# Patient Record
Sex: FEMALE | Race: ASIAN | NOT HISPANIC OR LATINO | Employment: FULL TIME | URBAN - METROPOLITAN AREA
[De-identification: names, ages, dates, MRNs, and addresses within clinical notes are randomized per-mention and may not be internally consistent; named-entity substitution may affect disease eponyms.]

---

## 2023-02-10 ENCOUNTER — HOSPITAL ENCOUNTER (EMERGENCY)
Facility: CLINIC | Age: 33
Discharge: HOME | End: 2023-02-10
Attending: EMERGENCY MEDICINE | Admitting: EMERGENCY MEDICINE
Payer: COMMERCIAL

## 2023-02-10 ENCOUNTER — APPOINTMENT (OUTPATIENT)
Dept: CT IMAGING | Facility: CLINIC | Age: 33
End: 2023-02-10
Attending: EMERGENCY MEDICINE
Payer: COMMERCIAL

## 2023-02-10 VITALS
BODY MASS INDEX: 23.56 KG/M2 | OXYGEN SATURATION: 98 % | TEMPERATURE: 97.9 F | SYSTOLIC BLOOD PRESSURE: 98 MMHG | HEIGHT: 60 IN | DIASTOLIC BLOOD PRESSURE: 65 MMHG | WEIGHT: 120 LBS | HEART RATE: 51 BPM | RESPIRATION RATE: 22 BRPM

## 2023-02-10 DIAGNOSIS — N20.0 KIDNEY STONE: ICD-10-CM

## 2023-02-10 LAB
ALBUMIN SERPL BCG-MCNC: 4.1 G/DL (ref 3.5–5.2)
ALBUMIN UR-MCNC: 10 MG/DL
ALP SERPL-CCNC: 49 U/L (ref 35–104)
ALT SERPL W P-5'-P-CCNC: 11 U/L (ref 10–35)
ANION GAP SERPL CALCULATED.3IONS-SCNC: 11 MMOL/L (ref 7–15)
APPEARANCE UR: ABNORMAL
AST SERPL W P-5'-P-CCNC: 27 U/L (ref 10–35)
BASOPHILS # BLD AUTO: 0 10E3/UL (ref 0–0.2)
BASOPHILS NFR BLD AUTO: 0 %
BILIRUB SERPL-MCNC: 0.2 MG/DL
BILIRUB UR QL STRIP: NEGATIVE
BUN SERPL-MCNC: 14 MG/DL (ref 6–20)
CALCIUM SERPL-MCNC: 9.2 MG/DL (ref 8.6–10)
CHLORIDE SERPL-SCNC: 99 MMOL/L (ref 98–107)
COLOR UR AUTO: ABNORMAL
CREAT SERPL-MCNC: 0.77 MG/DL (ref 0.51–0.95)
DEPRECATED HCO3 PLAS-SCNC: 26 MMOL/L (ref 22–29)
EOSINOPHIL # BLD AUTO: 0.1 10E3/UL (ref 0–0.7)
EOSINOPHIL NFR BLD AUTO: 1 %
ERYTHROCYTE [DISTWIDTH] IN BLOOD BY AUTOMATED COUNT: 12.7 % (ref 10–15)
GFR SERPL CREATININE-BSD FRML MDRD: >90 ML/MIN/1.73M2
GLUCOSE SERPL-MCNC: 116 MG/DL (ref 70–99)
GLUCOSE UR STRIP-MCNC: NEGATIVE MG/DL
HCG SERPL QL: NEGATIVE
HCT VFR BLD AUTO: 38.9 % (ref 35–47)
HGB BLD-MCNC: 12.7 G/DL (ref 11.7–15.7)
HGB UR QL STRIP: ABNORMAL
IMM GRANULOCYTES # BLD: 0 10E3/UL
IMM GRANULOCYTES NFR BLD: 0 %
KETONES UR STRIP-MCNC: NEGATIVE MG/DL
LEUKOCYTE ESTERASE UR QL STRIP: ABNORMAL
LYMPHOCYTES # BLD AUTO: 4.1 10E3/UL (ref 0.8–5.3)
LYMPHOCYTES NFR BLD AUTO: 41 %
MCH RBC QN AUTO: 28.3 PG (ref 26.5–33)
MCHC RBC AUTO-ENTMCNC: 32.6 G/DL (ref 31.5–36.5)
MCV RBC AUTO: 87 FL (ref 78–100)
MONOCYTES # BLD AUTO: 0.5 10E3/UL (ref 0–1.3)
MONOCYTES NFR BLD AUTO: 5 %
MUCOUS THREADS #/AREA URNS LPF: PRESENT /LPF
NEUTROPHILS # BLD AUTO: 5.4 10E3/UL (ref 1.6–8.3)
NEUTROPHILS NFR BLD AUTO: 53 %
NITRATE UR QL: NEGATIVE
NRBC # BLD AUTO: 0 10E3/UL
NRBC BLD AUTO-RTO: 0 /100
PH UR STRIP: 6 [PH] (ref 5–7)
PLATELET # BLD AUTO: 235 10E3/UL (ref 150–450)
POTASSIUM SERPL-SCNC: 3.8 MMOL/L (ref 3.4–5.3)
PROT SERPL-MCNC: 7.1 G/DL (ref 6.4–8.3)
RBC # BLD AUTO: 4.49 10E6/UL (ref 3.8–5.2)
RBC URINE: 125 /HPF
SODIUM SERPL-SCNC: 136 MMOL/L (ref 136–145)
SP GR UR STRIP: 1.02 (ref 1–1.03)
SQUAMOUS EPITHELIAL: 17 /HPF
UROBILINOGEN UR STRIP-MCNC: NORMAL MG/DL
WBC # BLD AUTO: 10.2 10E3/UL (ref 4–11)
WBC URINE: 7 /HPF

## 2023-02-10 PROCEDURE — 84703 CHORIONIC GONADOTROPIN ASSAY: CPT | Performed by: EMERGENCY MEDICINE

## 2023-02-10 PROCEDURE — 99285 EMERGENCY DEPT VISIT HI MDM: CPT | Mod: 25

## 2023-02-10 PROCEDURE — 96374 THER/PROPH/DIAG INJ IV PUSH: CPT | Mod: 59

## 2023-02-10 PROCEDURE — 258N000003 HC RX IP 258 OP 636: Performed by: EMERGENCY MEDICINE

## 2023-02-10 PROCEDURE — 80053 COMPREHEN METABOLIC PANEL: CPT | Performed by: EMERGENCY MEDICINE

## 2023-02-10 PROCEDURE — 81001 URINALYSIS AUTO W/SCOPE: CPT | Performed by: EMERGENCY MEDICINE

## 2023-02-10 PROCEDURE — 250N000013 HC RX MED GY IP 250 OP 250 PS 637: Performed by: EMERGENCY MEDICINE

## 2023-02-10 PROCEDURE — 96375 TX/PRO/DX INJ NEW DRUG ADDON: CPT

## 2023-02-10 PROCEDURE — 36415 COLL VENOUS BLD VENIPUNCTURE: CPT | Performed by: EMERGENCY MEDICINE

## 2023-02-10 PROCEDURE — 250N000009 HC RX 250: Performed by: EMERGENCY MEDICINE

## 2023-02-10 PROCEDURE — 250N000011 HC RX IP 250 OP 636: Performed by: EMERGENCY MEDICINE

## 2023-02-10 PROCEDURE — 74177 CT ABD & PELVIS W/CONTRAST: CPT

## 2023-02-10 PROCEDURE — 85018 HEMOGLOBIN: CPT | Performed by: EMERGENCY MEDICINE

## 2023-02-10 PROCEDURE — 96376 TX/PRO/DX INJ SAME DRUG ADON: CPT

## 2023-02-10 PROCEDURE — 96361 HYDRATE IV INFUSION ADD-ON: CPT

## 2023-02-10 RX ORDER — IOPAMIDOL 755 MG/ML
60 INJECTION, SOLUTION INTRAVASCULAR ONCE
Status: COMPLETED | OUTPATIENT
Start: 2023-02-10 | End: 2023-02-10

## 2023-02-10 RX ORDER — OXYCODONE HYDROCHLORIDE 5 MG/1
5 TABLET ORAL EVERY 6 HOURS PRN
Qty: 6 TABLET | Refills: 0 | Status: SHIPPED | OUTPATIENT
Start: 2023-02-10 | End: 2023-02-13

## 2023-02-10 RX ORDER — HYDROMORPHONE HYDROCHLORIDE 1 MG/ML
0.5 INJECTION, SOLUTION INTRAMUSCULAR; INTRAVENOUS; SUBCUTANEOUS
Status: DISCONTINUED | OUTPATIENT
Start: 2023-02-10 | End: 2023-02-10 | Stop reason: HOSPADM

## 2023-02-10 RX ORDER — ONDANSETRON 4 MG/1
4 TABLET, ORALLY DISINTEGRATING ORAL EVERY 8 HOURS PRN
Qty: 10 TABLET | Refills: 0 | Status: SHIPPED | OUTPATIENT
Start: 2023-02-10 | End: 2023-02-28

## 2023-02-10 RX ORDER — ONDANSETRON 2 MG/ML
4 INJECTION INTRAMUSCULAR; INTRAVENOUS EVERY 30 MIN PRN
Status: DISCONTINUED | OUTPATIENT
Start: 2023-02-10 | End: 2023-02-10 | Stop reason: HOSPADM

## 2023-02-10 RX ORDER — SODIUM CHLORIDE, SODIUM LACTATE, POTASSIUM CHLORIDE, CALCIUM CHLORIDE 600; 310; 30; 20 MG/100ML; MG/100ML; MG/100ML; MG/100ML
125 INJECTION, SOLUTION INTRAVENOUS CONTINUOUS
Status: DISCONTINUED | OUTPATIENT
Start: 2023-02-10 | End: 2023-02-10 | Stop reason: HOSPADM

## 2023-02-10 RX ORDER — OXYCODONE HYDROCHLORIDE 5 MG/1
5 TABLET ORAL ONCE
Status: COMPLETED | OUTPATIENT
Start: 2023-02-10 | End: 2023-02-10

## 2023-02-10 RX ADMIN — ONDANSETRON 4 MG: 2 INJECTION INTRAMUSCULAR; INTRAVENOUS at 03:54

## 2023-02-10 RX ADMIN — HYDROMORPHONE HYDROCHLORIDE 0.5 MG: 1 INJECTION, SOLUTION INTRAMUSCULAR; INTRAVENOUS; SUBCUTANEOUS at 03:01

## 2023-02-10 RX ADMIN — SODIUM CHLORIDE 60 ML: 900 INJECTION INTRAVENOUS at 02:37

## 2023-02-10 RX ADMIN — IOPAMIDOL 60 ML: 755 INJECTION, SOLUTION INTRAVENOUS at 02:37

## 2023-02-10 RX ADMIN — OXYCODONE HYDROCHLORIDE 5 MG: 5 TABLET ORAL at 03:54

## 2023-02-10 RX ADMIN — SODIUM CHLORIDE, POTASSIUM CHLORIDE, SODIUM LACTATE AND CALCIUM CHLORIDE 1000 ML: 600; 310; 30; 20 INJECTION, SOLUTION INTRAVENOUS at 02:20

## 2023-02-10 RX ADMIN — ONDANSETRON 4 MG: 2 INJECTION INTRAMUSCULAR; INTRAVENOUS at 02:20

## 2023-02-10 ASSESSMENT — ENCOUNTER SYMPTOMS
DIFFICULTY URINATING: 0
HEMATURIA: 0
DYSURIA: 0
BACK PAIN: 1
ABDOMINAL PAIN: 1
VOMITING: 1
NAUSEA: 1
DIARRHEA: 0

## 2023-02-10 ASSESSMENT — ACTIVITIES OF DAILY LIVING (ADL): ADLS_ACUITY_SCORE: 35

## 2023-02-10 NOTE — ED PROVIDER NOTES
History     Chief Complaint:  Abdominal Pain       The history is provided by the patient.      Lidia García is a 32 year old female who presents with abdominal pain. Patient reports that she was woken up out of sleep around midnight with severe right sided abdominal and back pain. She states that she is experiencing associated nausea and an episode of vomiting with no relief. She states that the pain is worst when she is laying down and rates it 7/10. She notes that she took 3 Ibuprofen 1.5 hours ago with no resolve. She adds that she was feeling fine during the day yesterday. She denies medical problems, or recent surgeries.     Independent Historian:   None - Patient Only    Review of External Notes: none     ROS:  Review of Systems   Gastrointestinal: Positive for abdominal pain, nausea and vomiting. Negative for diarrhea.   Genitourinary: Negative for difficulty urinating, dysuria and hematuria.   Musculoskeletal: Positive for back pain.   All other systems reviewed and are negative.    Allergies:  No Known Allergies     Medications:    Zoloft   Spironolactone     Past Medical History:    No past medical history on file.    Social History:  Presents with    Presents via private vehicle   PCP: No primary care provider on file.     Physical Exam     Patient Vitals for the past 24 hrs:   BP Temp Temp src Pulse Resp SpO2 Height Weight   02/10/23 0428 -- -- -- -- -- 98 % -- --   02/10/23 0427 98/65 -- -- 51 -- -- -- --   02/10/23 0132 116/69 97.9  F (36.6  C) Oral 65 22 100 % 1.524 m (5') 54.4 kg (120 lb)        Physical Exam  General: Appears well-developed and well-nourished.   Head: No signs of trauma.   CV: Normal rate and regular rhythm.    Resp: Effort normal and breath sounds normal. No respiratory distress.   GI: Soft. There is RLQ tenderness.  No rebound or guarding.  Normal bowel sounds.  No CVA tenderness.  MSK: Normal range of motion.   Neuro: The patient is alert and oriented. Speech  normal.  Skin: Skin is warm and dry. No rash noted.   Psych: normal mood and affect. behavior is normal.       Emergency Department Course   Imaging:  CT Abdomen Pelvis w Contrast   Final Result   IMPRESSION:    1.  3 mm obstructing stone distal right ureter at the UVJ resulting in minimal right hydronephrosis.      2.  Normal appendix. No acute bowel findings.      3.  Large amount of stool in the colon.      4.  2.8 cm left ovarian cyst is nonspecific on CT and most likely physiologic in nature. No specific follow-up needed unless clinically warranted.         Report per radiology    Laboratory:  Labs Ordered and Resulted from Time of ED Arrival to Time of ED Departure   COMPREHENSIVE METABOLIC PANEL - Abnormal       Result Value    Sodium 136      Potassium 3.8      Chloride 99      Carbon Dioxide (CO2) 26      Anion Gap 11      Urea Nitrogen 14.0      Creatinine 0.77      Calcium 9.2      Glucose 116 (*)     Alkaline Phosphatase 49      AST 27      ALT 11      Protein Total 7.1      Albumin 4.1      Bilirubin Total 0.2      GFR Estimate >90     URINE MACROSCOPIC WITH REFLEX TO MICRO - Abnormal    Color Urine Light Yellow      Appearance Urine Slightly Cloudy (*)     Glucose Urine Negative      Bilirubin Urine Negative      Ketones Urine Negative      Specific Gravity Urine 1.022      Blood Urine Large (*)     pH Urine 6.0      Protein Albumin Urine 10 (*)     Urobilinogen Urine Normal      Nitrite Urine Negative      Leukocyte Esterase Urine Moderate (*)     RBC Urine 125 (*)     WBC Urine 7 (*)     Squamous Epithelials Urine 17 (*)     Mucus Urine Present (*)    HCG QUALITATIVE PREGNANCY - Normal    hCG Serum Qualitative Negative     CBC WITH PLATELETS AND DIFFERENTIAL    WBC Count 10.2      RBC Count 4.49      Hemoglobin 12.7      Hematocrit 38.9      MCV 87      MCH 28.3      MCHC 32.6      RDW 12.7      Platelet Count 235      % Neutrophils 53      % Lymphocytes 41      % Monocytes 5      % Eosinophils 1       % Basophils 0      % Immature Granulocytes 0      NRBCs per 100 WBC 0      Absolute Neutrophils 5.4      Absolute Lymphocytes 4.1      Absolute Monocytes 0.5      Absolute Eosinophils 0.1      Absolute Basophils 0.0      Absolute Immature Granulocytes 0.0      Absolute NRBCs 0.0        Emergency Department Course & Assessments:    Interventions:  Medications   lactated ringers BOLUS 1,000 mL (0 mLs Intravenous Stopped 2/10/23 0428)     Followed by   lactated ringers infusion (125 mL/hr Intravenous Not Given 2/10/23 0353)   ondansetron (ZOFRAN) injection 4 mg (4 mg Intravenous Given 2/10/23 0354)   HYDROmorphone (PF) (DILAUDID) injection 0.5 mg (0.5 mg Intravenous Given 2/10/23 0301)   iopamidol (ISOVUE-370) solution 60 mL (60 mLs Intravenous Given 2/10/23 0237)   Saline (60 mLs Intravenous Given 2/10/23 0237)   oxyCODONE (ROXICODONE) tablet 5 mg (5 mg Oral Given 2/10/23 0354)      Independent Interpretation (X-rays, CTs, rhythm strip):  I reviewed CT     Social Determinants of Health affecting care:   None    Assessments:  ED Course as of 02/10/23 0439   Fri Feb 10, 2023   0152 I obtained history and examined the patient as noted above    0335 I rechecked the patient      Disposition:  The patient was discharged to home.     Impression & Plan    Medical Decision Making:  Lidia García is a 32-year-old woman presents due to right-sided abdominal pain.  She awoke with the pain, had some nausea.  She did take ibuprofen at home.  On evaluation she did have tenderness to the right lower quadrant of the abdomen.  Blood was obtained and was reassuring.  UA did show red blood cells.  CT scan showed findings consistent with a ureteral kidney stone, which would be consistent with her presentation.  I discussed supportive care instructions and she is instructed to return for any signs of infection.  Patient was discharged home    Diagnosis:    ICD-10-CM    1. Kidney stone  N20.0            Discharge Medications:  Discharge  Medication List as of 2/10/2023  4:28 AM      START taking these medications    Details   ondansetron (ZOFRAN ODT) 4 MG ODT tab Take 1 tablet (4 mg) by mouth every 8 hours as needed for nausea, Disp-10 tablet, R-0, E-Prescribe      oxyCODONE (ROXICODONE) 5 MG tablet Take 1 tablet (5 mg) by mouth every 6 hours as needed for severe pain (7-10), Disp-6 tablet, R-0, E-Prescribe              Scribe Disclosure:  I, Kathy Richardson, am serving as a scribe at 2:21 AM on 2/10/2023 to document services personally performed by Dave Estrella MD based on my observations and the provider's statements to me.     2/10/2023   Dave Estrella MD Bergenstal, John A, MD  02/11/23 0745

## 2023-02-10 NOTE — ED TRIAGE NOTES
RLQ radiates to lower back pain that woke her up from sleep 2 hrs ago. Took 600 mg of ibuprofen PTA without relief.     Triage Assessment     Row Name 02/10/23 0133       Triage Assessment (Adult)    Airway WDL WDL       Respiratory WDL    Respiratory WDL WDL       Skin Circulation/Temperature WDL    Skin Circulation/Temperature WDL WDL       Cardiac WDL    Cardiac WDL WDL       Peripheral/Neurovascular WDL    Peripheral Neurovascular WDL WDL       Cognitive/Neuro/Behavioral WDL    Cognitive/Neuro/Behavioral WDL WDL

## 2023-02-28 ENCOUNTER — OFFICE VISIT (OUTPATIENT)
Dept: INTERNAL MEDICINE | Facility: CLINIC | Age: 33
End: 2023-02-28
Payer: COMMERCIAL

## 2023-02-28 VITALS
BODY MASS INDEX: 25.27 KG/M2 | OXYGEN SATURATION: 99 % | DIASTOLIC BLOOD PRESSURE: 68 MMHG | HEIGHT: 60 IN | WEIGHT: 128.7 LBS | SYSTOLIC BLOOD PRESSURE: 103 MMHG | HEART RATE: 62 BPM

## 2023-02-28 DIAGNOSIS — F32.89 OTHER DEPRESSION: Primary | ICD-10-CM

## 2023-02-28 DIAGNOSIS — L70.0 ACNE VULGARIS: ICD-10-CM

## 2023-02-28 PROCEDURE — 99204 OFFICE O/P NEW MOD 45 MIN: CPT | Mod: GE

## 2023-02-28 RX ORDER — SPIRONOLACTONE 25 MG/1
75 TABLET ORAL DAILY
COMMUNITY
End: 2023-02-28

## 2023-02-28 RX ORDER — SPIRONOLACTONE 25 MG/1
75 TABLET ORAL DAILY
Qty: 90 TABLET | Refills: 3 | Status: SHIPPED | OUTPATIENT
Start: 2023-02-28 | End: 2023-08-21

## 2023-02-28 ASSESSMENT — ENCOUNTER SYMPTOMS
NAUSEA: 0
DYSURIA: 0
CONSTIPATION: 1
JAUNDICE: 0
BLOOD IN STOOL: 0
FLANK PAIN: 1
RECTAL PAIN: 0
ABDOMINAL PAIN: 0
DIARRHEA: 0
BOWEL INCONTINENCE: 0
DIFFICULTY URINATING: 0
HEMATURIA: 0
VOMITING: 0
HEARTBURN: 0
BLOATING: 1

## 2023-02-28 NOTE — NURSING NOTE
Lidia García is a 32 year old female patient that presents today in clinic for the following:    Chief Complaint   Patient presents with     Establish Care     Physical, history of abnormal paps     The patient's allergies and medications were reviewed as noted. A set of vitals were recorded as noted without incident. The patient does not have any other questions for the provider.    Gal Cain, EMT at 1:08 PM on 2/28/2023

## 2023-02-28 NOTE — PROGRESS NOTES
PRIMARY [facility]ARE CENTER         HPI:      HPI:  Lidia García is a 32 year old female with PMH of Depression and Acne who presents to clinic to establish care.  She recently moved from [place] to [place] in 2023. Diagnosed with depression in college. Started taking Sertraline about 1.5 yrs ago around 2021. Has not had a depressive episode since college. Her last annual wellness exam was in April 2022.     Takes spironolactone for acne which helps her. She has been taking that for about 5 yrs. She has tried topicals in the past, but they have not been covered by her insurance.  She has never had issues with electrolyte abnormalities    She was in the ED on 2/10/23 due to RLQ abd pain which radiated to her flank. Was found to have a  3 mm obstructing stone distal right ureter at the UVJ resulting in minimal right hydronephrosis. She was given pain medication and a mesh to urinate into. She did not feel it pass and never caught a stone.  No issues since.     She used to have heavy periods. She got Mirena in 2019. Her periods are now very irregular. She can feel that the strings are still in place. She has had a lot of bloating and constipation in the past. She has had irregular pap smears as well.  Her last 2 Pap smears have been irregular.  She was followed by OB/GYN at [facility] and was told she needed to have a repeat Pap in 1 year.  Denies fatigue,  fevers, chills, CP, SOB, abd pain, N/V/D and BLE edema      Family History:  I have reviewed this patient's family history and updated it with pertinent information if needed.  Family History   Problem Relation Age of Onset     Asthma Mother      Eczema Mother      Diabetes Father      Hypertension Father      Arrhythmia Brother             Social History:  Occupation: Therapist at [employer]  Lives: [place], closing on a house soon  Smoking: never cigarettes  Other rec/illicit drug use: Used to smoke marijuana (no substances in the past 10 yrs)  Sexual activity:  sexually active with her   Alcohol: 1 drink about 2-3 times per week  Has a younger brother and an older half brother    Medications:  Current Outpatient Medications   Medication     sertraline (ZOLOFT) 50 MG tablet     spironolactone (ALDACTONE) 25 MG tablet     No current facility-administered medications for this visit.        Allergies:   No Known Allergies    Medical History:  Past Medical History:   Diagnosis Date     Acne vulgaris      Depression        Surgical History:  Past Surgical History:   Procedure Laterality Date     wisdom teeth      2006 and 2019              Review of Systems:     See HPI for pertinent ROS         Physical Exam:   /68 (BP Location: Left arm, Patient Position: Sitting, Cuff Size: Adult Regular)   Pulse 62   Ht 1.524 m (5')   Wt 58.4 kg (128 lb 11.2 oz)   LMP 02/14/2023 (Within Days)   SpO2 99%   Breastfeeding No   BMI 25.13 kg/m    Body mass index is 25.13 kg/m .  Vitals were reviewed    Physical Exam:   General: Sitting upright, talking in complete sentences, non-distressed  HEENT: Normocephalic, atraumatic, PERRL, EOMI, no conjunctival pallor, anicteric, nares patent, oropharynx clear and moist  CVS: S1/S2 WNL, RRR, no murmur, no LE edema  Pulm: Non-labored breathing, clear to auscultation b/l, no crackles or wheeze  Abdo: Non-distended, non-tender to palpation, no rebound or guarding, BS present   MSK: No obvious bony deformities, no clubbing  Skin: Warm and dry, no obvious rashes  Neuro: Alert and oriented x3, non-focal, moves all extremities spontaneously  Psych: Normal mood and affect, very pleasant      Assessment and Plan     Lidia García is a 32 year old female with PMH of Depression and Acne who presents to clinic to establish care.    Health Maintenance  Irregular Periods  We we will do an annual wellness exam in May.  She was previously seen at [facility].  She was told that she needed a Pap smear yearly.  She also reports having  "irregular periods recently. Recent renal stone in 2/23, was seen at [facility] ED. Stone passed. Recent labs normal. CT A/P showed an ovarian cyst, likely benign per imaging report. We will try to get her records from [facility] (she completed RAY).  We will plan to repeat her Pap in May at her annual exam.    Per [facility] note:  \"- LSIL pap--initiallly HPV neg, 4/2022 HPV +  A/P Adequate Colposcopy, no cervical lesions seen grossly or on colpo  Iram'l hx of HPV discussed  - AUB--resolved since her past visit- cont to track menses with Mirena---slightly long spotting even with Tampons use  Normal u/s 1 yr ago- tiny fibroid\"      Other depression  -     sertraline (ZOLOFT) 50 MG tablet; Take 1 tablet (50 mg) by mouth daily    Acne vulgaris  -     spironolactone (ALDACTONE) 25 MG tablet; Take 3 tablets (75 mg) by mouth daily 3 TABLETS DAILY      Additional Issues:  - Wellness exam next visit      RTC in may 2023 for annual exam    Options for treatment and follow-up care were reviewed with the patient. Lidia García engaged in the decision making process and verbalized understanding of the options discussed and agreed with the final plan.        Pt was seen and plan of care discussed with AISHA DUDLEY MD  Internal Medicine-PGY2  [facility]  Pager: [phone]  Feb 28, 2023    "

## 2023-03-07 NOTE — PROGRESS NOTES
Lidia is a 32 year old who is being evaluated via a billable video visit.      How would you like to obtain your AVS? MyChart  If the video visit is dropped, the invitation should be resent by: Text to cell phone: [phone]  Will anyone else be joining your video visit? No        Assessment & Plan   Problem List Items Addressed This Visit    None  Visit Diagnoses     Temporomandibular joint disorder    -  Primary    Relevant Orders    Physical Therapy Referral    Slow transit constipation        Relevant Medications    polyethylene glycol (MIRALAX) 17 GM/Dose powder    External hemorrhoids        Relevant Medications    hydrocortisone (ANUSOL-HC) 25 MG suppository         Reduce starchy food in the diet  Increase water intake and fruit and veggies  Miralax 1 capful daily for 3-5 days as needed when constipated  Use HC suppository to help heal hemorrhoids     12 minutes spent on the date of the encounter doing chart review, history and exam, documentation and further activities per the note       BMI:   Estimated body mass index is 25.13 kg/m  as calculated from the following:    Height as of 2/28/23: 1.524 m (5').    Weight as of 2/28/23: 58.4 kg (128 lb 11.2 oz).   Weight management plan: Discussed healthy diet and exercise guidelines        Return in about 1 month (around 4/8/2023), or if symptoms worsen or fail to improve, for in person.    Analia Greene PA-C  [facility]    Annabella Murillo is a 32 year old, presenting for the following health issues:  Constipation, Hemorrhoids, and Referral (Physical therapy for TMJ )      History of Present Illness       Reason for visit:  Constipation and Rectal Bleeding  Symptom onset:  More than a month  Symptom intensity:  Moderate  Symptom progression:  Staying the same  Had these symptoms before:  No        Constipation  Onset/Duration: 6 months   Description:  Frequency of bowel movements: on an almost daily basis  Consistency  of stool: Normal recently  Progression of Symptoms: same and waxing and waning  Accompanying signs and symptoms:    Abdominal pain: No   Rectal pain: YES   Blood in stool: YES- blood after wiping    Nausea/Vomiting: No   Weight loss or gain: YES- weight gain   History:   Similar problems in past: YES, on and off  History of abdominal surgery: No  Chronic laxative use: No  New medications: No  Precipitating or alleviating factors: None   Therapies tried and outcome: None    Hemorrhoids       Duration: intermittent 6 months     Description:   Pain: no   Itching: YES    Accompanying signs and symptoms:   Blood in stool: YES- on toilet paper  Changes in stool pattern: YES- constipated on and off     History (similar episodes/previous evaluation): eats high starch diet daily -rice, pasta, bread    Precipitating or alleviating factors: constipation    Therapies tried and outcome: increased fiber in diet      Concern:     Patient needs a referral to physical therapy for TMJ disorder. Patient has been doing therapy in [facility], and since she moved here, she schedule appointment with [facility] clinic, but needs a referral for insurance coverage.       Review of Systems   Constitutional, HEENT, cardiovascular, pulmonary, gi and gu systems are negative, except as otherwise noted.      Objective           Vitals:  No vitals were obtained today due to virtual visit.    Physical Exam   GENERAL: Healthy, alert and no distress  EYES: Eyes grossly normal to inspection.  No discharge or erythema, or obvious scleral/conjunctival abnormalities.  RESP: No audible wheeze, cough, or visible cyanosis.  No visible retractions or increased work of breathing.    SKIN: Visible skin clear. No significant rash, abnormal pigmentation or lesions.  NEURO: Cranial nerves grossly intact.  Mentation and speech appropriate for age.  PSYCH: Mentation appears normal, affect normal/bright, judgement and insight intact, normal speech and appearance  well-groomed.                Video-Visit Details    Type of service:  Video Visit     Originating Location (pt. Location): Home  Distant Location (provider location):  Off-site  Platform used for Video Visit: Tomasa

## 2023-03-07 NOTE — PATIENT INSTRUCTIONS
At [facility], we strive to deliver an exceptional experience to you, every time we see you. If you receive a survey, please complete it as we do value your feedback.  If you have MyChart, you can expect to receive results automatically within 24 hours of their completion.  Your provider will send a note interpreting your results as well.   If you do not have MyChart, you should receive your results in about a week by mail.    Your care team:                            Family Medicine Internal Medicine   MD Kofi Drew MD Shantel Branch-Fleming, MD Srinivasa Vaka, MD Katya Belousova, PAJUNIOR Haywood CNP, MD ([facility]   Jordy Reyes, MD Helga Mayer MD Amelia Massimini APRN DAIANA Patrick APRN MD Chris Ramos MD          Clinic hours: Monday - Thursday 7 am-6 pm; Fridays 7 am-5 pm.   Urgent care: Monday - Friday 10 am- 8 pm; Saturday and Sunday 9 am-5 pm.    Clinic: [phone]       [facility]: Monday - Thursday 8 am - 7 pm; Friday 8 am - 6 pm  [facility]: [phone]

## 2023-03-08 ENCOUNTER — VIRTUAL VISIT (OUTPATIENT)
Dept: FAMILY MEDICINE | Facility: CLINIC | Age: 33
End: 2023-03-08
Payer: COMMERCIAL

## 2023-03-08 DIAGNOSIS — M26.609 TEMPOROMANDIBULAR JOINT DISORDER: Primary | ICD-10-CM

## 2023-03-08 DIAGNOSIS — K59.01 SLOW TRANSIT CONSTIPATION: ICD-10-CM

## 2023-03-08 DIAGNOSIS — K64.4 EXTERNAL HEMORRHOIDS: ICD-10-CM

## 2023-03-08 PROCEDURE — 99213 OFFICE O/P EST LOW 20 MIN: CPT | Mod: VID | Performed by: PHYSICIAN ASSISTANT

## 2023-03-08 RX ORDER — POLYETHYLENE GLYCOL 3350 17 G/17G
1 POWDER, FOR SOLUTION ORAL DAILY
Qty: 527 G | Refills: 1 | Status: SHIPPED | OUTPATIENT
Start: 2023-03-08 | End: 2023-04-23

## 2023-03-08 RX ORDER — HYDROCORTISONE ACETATE 25 MG/1
25 SUPPOSITORY RECTAL 2 TIMES DAILY
Qty: 24 SUPPOSITORY | Refills: 3 | Status: SHIPPED | OUTPATIENT
Start: 2023-03-08 | End: 2023-05-09

## 2023-03-27 ENCOUNTER — THERAPY VISIT (OUTPATIENT)
Dept: PHYSICAL THERAPY | Facility: CLINIC | Age: 33
End: 2023-03-27
Payer: COMMERCIAL

## 2023-03-27 DIAGNOSIS — M26.602 LEFT TEMPOROMANDIBULAR JOINT DISORDER, UNSPECIFIED: ICD-10-CM

## 2023-03-27 DIAGNOSIS — M26.609 TEMPOROMANDIBULAR JOINT DISORDER: ICD-10-CM

## 2023-03-27 PROCEDURE — 97140 MANUAL THERAPY 1/> REGIONS: CPT | Mod: GP | Performed by: PHYSICAL THERAPIST

## 2023-03-27 PROCEDURE — 97161 PT EVAL LOW COMPLEX 20 MIN: CPT | Mod: GP | Performed by: PHYSICAL THERAPIST

## 2023-03-27 NOTE — PROGRESS NOTES
Physical Therapy Initial Evaluation  Subjective:  Lidia García presents to outpatient PT with chronic left sided TMJ pain.  H/o locking jaw when younger, but can't remember the last time this happened.  Symptoms described as muscle tightness.  H/o migraines (2-3x/week).  Has a bottom retainer (braces as teenager) and a nightguard.  Thinks she clenches/grinds while sleeping but not sure (does not do this when awake).  Patient had 3 PT visits when living in [place] (recently moved to [place]), has not been great about keeping up with prescribed exercises.    The history is provided by the patient. No  was used.   Patient Health History  Lidia García being seen for left TMJ.       Problem occurred: ?          Red flags:  None as reported by patient.  Medical allergies: none.           Current occupation is therapist (mental health).   Primary job tasks include:  Prolonged sitting and computer work.                  Therapist Generated HPI Evaluation         Type of problem:  TMJ left.    This is a chronic condition.  Condition occurred with:  Insidious onset.  Where condition occurred: for unknown reasons.  Patient reports pain:  Cervical left.  Pain is described as other and is intermittent.  Pain radiates to:  Head. Pain is worse in the A.M..  Since onset symptoms are gradually improving.  Associated symptoms:  Headache cervical and headache migraine. Symptoms are exacerbated by stress and clenching      Previous treatment includes physical therapy. There was moderate improvement following previous treatment.  Restrictions due to condition include:  Working in normal job without restrictions.  Barriers include:  None as reported by patient.                        Objective:  System                                      TMJ Evaluation  ROM:       AROM TMJ:  Openin mm     Left Laterotrusion:  Mod restriction    Right Laterotrusion:  WNL          Associated Findings: Headaches:  Cervical and  migraine    Previous Interventions:    Intraoral Appliances:  Nigthtime mouth guard  Dental/Orthotics:  Bottom retainer  Palpation:    Left side tenderness present at:  Upper Trap and Superficial Masseter      Movement Characteristics:    Click:  Used to experience more clicking, much less often now (none noticed during evaluation)    Deviations:  Present                      Donna Cervical Evaluation    Posture:  Sitting: good  Standing: good  Protruding Head: no  Wry Neck: no      Movement Loss:  Protrusion (PRO): nil  Flexion (Flex): min  Retraction (RET): mod  Extension (EXT): min and pain  Lateral Flexion Right (LF R): min  Lateral Flexion Left (LF L): mod  Rotation Right (ROT R): nil  Rotation Left (ROT L): min                               Donna Thoracic Evalution:    Movement Loss:      Rotation Lt (ROT L): mod  Rotation Rt (ROT R): min                            ROS    Assessment/Plan:    Patient is a 32 year old female with left side TMJ complaints.    Patient has the following significant findings with corresponding treatment plan.                Diagnosis 1:  Left TMD  Pain -  manual therapy, self management, education and home program  Decreased ROM/flexibility - manual therapy, therapeutic exercise and home program  Decreased joint mobility - manual therapy, therapeutic exercise and home program  Impaired posture - neuro re-education and home program    Therapy Evaluation Codes:   1) History comprised of:   Personal factors that impact the plan of care:      None.    Comorbidity factors that impact the plan of care are:      None.     Medications impacting care: see EPIC.  2) Examination of Body Systems comprised of:   Body structures and functions that impact the plan of care:      Cervical spine.   Activity limitations that impact the plan of care are:      Reading/Computer work and Sleeping.  3) Clinical presentation characteristics are:   Stable/Uncomplicated.  4) Decision-Making    Low  complexity using standardized patient assessment instrument and/or measureable assessment of functional outcome.  Cumulative Therapy Evaluation is: Low complexity.    Previous and current functional limitations:  (See Goal Flow Sheet for this information)    Short term and Long term goals: (See Goal Flow Sheet for this information)     Communication ability:  Patient appears to be able to clearly communicate and understand verbal and written communication and follow directions correctly.  Treatment Explanation - The following has been discussed with the patient:   RX ordered/plan of care  Anticipated outcomes  Possible risks and side effects  This patient would benefit from PT intervention to resume normal activities.   Rehab potential is good.    Frequency:  1 X week, once daily  Duration:  for 6 weeks  Discharge Plan:  Achieve all LTG.  Independent in home treatment program.  Reach maximal therapeutic benefit.    Please refer to the daily flowsheet for treatment today, total treatment time and time spent performing 1:1 timed codes.

## 2023-04-03 ENCOUNTER — THERAPY VISIT (OUTPATIENT)
Dept: PHYSICAL THERAPY | Facility: CLINIC | Age: 33
End: 2023-04-03
Payer: COMMERCIAL

## 2023-04-03 DIAGNOSIS — M26.602 LEFT TEMPOROMANDIBULAR JOINT DISORDER, UNSPECIFIED: Primary | ICD-10-CM

## 2023-04-03 PROCEDURE — 97110 THERAPEUTIC EXERCISES: CPT | Mod: GP | Performed by: PHYSICAL THERAPIST

## 2023-04-03 PROCEDURE — 97140 MANUAL THERAPY 1/> REGIONS: CPT | Mod: GP | Performed by: PHYSICAL THERAPIST

## 2023-04-20 ENCOUNTER — OFFICE VISIT (OUTPATIENT)
Dept: INTERNAL MEDICINE | Facility: CLINIC | Age: 33
End: 2023-04-20
Payer: COMMERCIAL

## 2023-04-20 VITALS — DIASTOLIC BLOOD PRESSURE: 72 MMHG | OXYGEN SATURATION: 98 % | SYSTOLIC BLOOD PRESSURE: 122 MMHG | HEART RATE: 72 BPM

## 2023-04-20 DIAGNOSIS — K64.4 EXTERNAL HEMORRHOIDS: Primary | ICD-10-CM

## 2023-04-20 PROCEDURE — 99214 OFFICE O/P EST MOD 30 MIN: CPT | Performed by: NURSE PRACTITIONER

## 2023-04-20 NOTE — PATIENT INSTRUCTIONS
Apply externally: Preparation H    Keep rectal area clean and dry.    Keep stool soft.   no pooling or drooling of secretions

## 2023-04-20 NOTE — NURSING NOTE
Lidia García is a 32 year old female patient that presents today in clinic for the following:    Chief Complaint   Patient presents with     RECHECK     Follow up with blood with bowel movements, Constipation resolved     The patient's allergies and medications were reviewed as noted. A set of vitals were recorded as noted without incident. The patient does not have any other questions for the provider.    Gal Cain, EMT at 1:24 PM on 4/20/2023

## 2023-04-20 NOTE — PROGRESS NOTES
S: Lidia García is a 32 year old female here to follow-up on rectal bleeding. She continues to see BRBTP which is red and not dark red.  She states that she feels something protruding from her anal area.  She was previously constipated but used some suppositories and is no longer symptomatic for constipation. She has never been pregnant.     She is scheduled for a wellness exam with her primary provider, Dr. Lobato, in May.      Patient Active Problem List   Diagnosis     Left temporomandibular joint disorder, unspecified            Past Medical History:   Diagnosis Date     Acne vulgaris      Depression             Past Surgical History:   Procedure Laterality Date     wisdom teeth      2006 and 2019            Social History     Tobacco Use     Smoking status: Never     Smokeless tobacco: Never   Vaping Use     Vaping status: Never Used   Substance Use Topics     Alcohol use: Yes     Alcohol/week: 3.0 standard drinks of alcohol     Types: 3 Standard drinks or equivalent per week            Family History   Problem Relation Age of Onset     Asthma Mother      Eczema Mother      Diabetes Father      Hypertension Father      Arrhythmia Brother              No Known Allergies         Current Outpatient Medications   Medication Sig Dispense Refill     hydrocortisone (ANUSOL-HC) 25 MG suppository Place 1 suppository (25 mg) rectally 2 times daily 24 suppository 3     sertraline (ZOLOFT) 50 MG tablet Take 1 tablet (50 mg) by mouth daily 30 tablet 3     spironolactone (ALDACTONE) 25 MG tablet Take 3 tablets (75 mg) by mouth daily 3 TABLETS DAILY 90 tablet 3     polyethylene glycol (MIRALAX) 17 GM/Dose powder Take 17 g (1 capful.) by mouth daily 527 g 1       REVIEW OF SYSTEMS:  See above.    O:   /72 (BP Location: Right arm, Patient Position: Sitting, Cuff Size: Adult Regular)   Pulse 72   LMP 02/14/2023 (Within Days)   SpO2 98%   GENERAL APPEARANCE: healthy, alert and no distress  RECTUM: She has several  protruding skin lesions/hemorrhoids protruding from the anus.  NEURO: alert and oriented.  Good historian.  PSYCHE: normal.     A/P:  Bleeding external hemorrhoids.  Internal exam was not performed today as she is being referred to [facility].    The patient voiced understanding of the information discussed and all questions were answered.     I spent a total of 30 minutes in the care of this pt during today's office visit. This time includes reviewing the patient's chart and prior history, obtaining a history, performing an examination and evaluation and counseling the patient. This time also includes ordering medications or tests necessary in addition to communication to other member's of the patient's health care team. Time spent in documentation and care coordination is included.     Kimberly PACHECO, CNP

## 2023-04-25 NOTE — TELEPHONE ENCOUNTER
Diagnosis, Referred by & from: External hemorrhoids [K64.4]  ref by Kimberly Kasper APRN CNP  recs in epic   Appt date: 7/12/23   NOTES STATUS DETAILS   OFFICE NOTE from referring provider Internal 4/20/23 OV Kimberly Kasper APRN CNP   OFFICE NOTE from other specialist Internal [facility]:  5/9/23 - [facility] OV with Dr. Lobato   DISCHARGE SUMMARY from hospital N/A    DISCHARGE REPORT from the ER N/A    OPERATIVE REPORT N/A    MEDICATION LIST Internal    LABS N/A    DIAGNOSTIC PROCEDURES N/A    IMAGING (DISC & REPORT)      CT Internal Internal: PACS   CT ABD: 2/10/23   ULTRASOUND  (ENDOANAL/ENDORECTAL) Care Everywhere [facility]: PACs  US PELVIS: 4/9/21     Records Requested  04/25/23    [place] [address]    Ph. [phone]  Fax: [phone]   Outcome Called facility, connected with film library. Sent a fax for imaging disc via [employer] Tracking #: [id number]    * 4/27/23 2:46 PM Imaging disc received from [facility] and sent to [facility] to be uploaded into PACs. - Michelle

## 2023-05-09 ENCOUNTER — OFFICE VISIT (OUTPATIENT)
Dept: INTERNAL MEDICINE | Facility: CLINIC | Age: 33
End: 2023-05-09
Payer: COMMERCIAL

## 2023-05-09 ENCOUNTER — LAB (OUTPATIENT)
Dept: LAB | Facility: CLINIC | Age: 33
End: 2023-05-09
Payer: COMMERCIAL

## 2023-05-09 VITALS
DIASTOLIC BLOOD PRESSURE: 70 MMHG | TEMPERATURE: 98.6 F | BODY MASS INDEX: 24.95 KG/M2 | SYSTOLIC BLOOD PRESSURE: 105 MMHG | HEART RATE: 65 BPM | WEIGHT: 127.1 LBS | HEIGHT: 60 IN | OXYGEN SATURATION: 98 %

## 2023-05-09 DIAGNOSIS — R63.5 WEIGHT GAIN: ICD-10-CM

## 2023-05-09 DIAGNOSIS — F32.89 OTHER DEPRESSION: ICD-10-CM

## 2023-05-09 DIAGNOSIS — Z00.00 HEALTHCARE MAINTENANCE: Primary | ICD-10-CM

## 2023-05-09 DIAGNOSIS — Z00.00 HEALTHCARE MAINTENANCE: ICD-10-CM

## 2023-05-09 DIAGNOSIS — K59.00 CONSTIPATION, UNSPECIFIED CONSTIPATION TYPE: ICD-10-CM

## 2023-05-09 DIAGNOSIS — R53.83 FATIGUE, UNSPECIFIED TYPE: ICD-10-CM

## 2023-05-09 DIAGNOSIS — L70.0 ACNE VULGARIS: ICD-10-CM

## 2023-05-09 LAB
ANION GAP SERPL CALCULATED.3IONS-SCNC: 8 MMOL/L (ref 7–15)
BUN SERPL-MCNC: 13.5 MG/DL (ref 6–20)
CALCIUM SERPL-MCNC: 9.5 MG/DL (ref 8.6–10)
CHLORIDE SERPL-SCNC: 104 MMOL/L (ref 98–107)
CREAT SERPL-MCNC: 0.69 MG/DL (ref 0.51–0.95)
DEPRECATED HCO3 PLAS-SCNC: 28 MMOL/L (ref 22–29)
ERYTHROCYTE [DISTWIDTH] IN BLOOD BY AUTOMATED COUNT: 12.8 % (ref 10–15)
GFR SERPL CREATININE-BSD FRML MDRD: >90 ML/MIN/1.73M2
GLUCOSE SERPL-MCNC: 91 MG/DL (ref 70–99)
HCT VFR BLD AUTO: 36 % (ref 35–47)
HGB BLD-MCNC: 12 G/DL (ref 11.7–15.7)
MCH RBC QN AUTO: 28.6 PG (ref 26.5–33)
MCHC RBC AUTO-ENTMCNC: 33.3 G/DL (ref 31.5–36.5)
MCV RBC AUTO: 86 FL (ref 78–100)
PLATELET # BLD AUTO: 213 10E3/UL (ref 150–450)
POTASSIUM SERPL-SCNC: 4.1 MMOL/L (ref 3.4–5.3)
RBC # BLD AUTO: 4.19 10E6/UL (ref 3.8–5.2)
SODIUM SERPL-SCNC: 140 MMOL/L (ref 136–145)
TSH SERPL DL<=0.005 MIU/L-ACNC: 1.29 UIU/ML (ref 0.3–4.2)
WBC # BLD AUTO: 8.2 10E3/UL (ref 4–11)

## 2023-05-09 PROCEDURE — 86704 HEP B CORE ANTIBODY TOTAL: CPT | Mod: 90 | Performed by: PATHOLOGY

## 2023-05-09 PROCEDURE — G0124 SCREEN C/V THIN LAYER BY MD: HCPCS | Performed by: PATHOLOGY

## 2023-05-09 PROCEDURE — 99395 PREV VISIT EST AGE 18-39: CPT | Mod: GC

## 2023-05-09 PROCEDURE — 99000 SPECIMEN HANDLING OFFICE-LAB: CPT | Performed by: PATHOLOGY

## 2023-05-09 PROCEDURE — 87340 HEPATITIS B SURFACE AG IA: CPT | Mod: 90 | Performed by: PATHOLOGY

## 2023-05-09 PROCEDURE — 85027 COMPLETE CBC AUTOMATED: CPT | Performed by: PATHOLOGY

## 2023-05-09 PROCEDURE — 86706 HEP B SURFACE ANTIBODY: CPT | Mod: 90 | Performed by: PATHOLOGY

## 2023-05-09 PROCEDURE — G0145 SCR C/V CYTO,THINLAYER,RESCR: HCPCS | Performed by: INTERNAL MEDICINE

## 2023-05-09 PROCEDURE — 36415 COLL VENOUS BLD VENIPUNCTURE: CPT | Performed by: PATHOLOGY

## 2023-05-09 PROCEDURE — 87389 HIV-1 AG W/HIV-1&-2 AB AG IA: CPT | Mod: 90 | Performed by: PATHOLOGY

## 2023-05-09 PROCEDURE — 80048 BASIC METABOLIC PNL TOTAL CA: CPT | Performed by: PATHOLOGY

## 2023-05-09 PROCEDURE — 87624 HPV HI-RISK TYP POOLED RSLT: CPT | Mod: 90 | Performed by: PATHOLOGY

## 2023-05-09 PROCEDURE — 99214 OFFICE O/P EST MOD 30 MIN: CPT | Mod: 25

## 2023-05-09 PROCEDURE — 84443 ASSAY THYROID STIM HORMONE: CPT | Performed by: PATHOLOGY

## 2023-05-09 NOTE — Clinical Note
FYI- I added additional diagnoses to the visit diagnosis list and also added the preventative visit code.  Thanks.

## 2023-05-09 NOTE — PROGRESS NOTES
PRIMARY CARE CENTER         HPI:      HPI:  Lidia García is a 32 year old female with PMH of Depression and Acne who presents for her annual wellness exam.    She has been doing overall well.  She does endorse constipation over the past 1.5 years.  She used to have a BM once daily and now she is having 1 every 2 to 3 days.  She briefly tried using MiraLAX for 1 week and felt that her constipation was improved.  She has since stopped using this and her constipation returned. She has also noticed fatigue and about a 15 pound weight gain.  States her diet has not changed.  She also endorses some bloating.  She does feel like this is improved when she defecates.  No changes in her activity level either.  She does feel like she needs to sleep more.  She denies fevers, chills, chest pain, shortness of breath,, pain, vomiting, skin changes, hair changes, palpitations, lower extremity swelling, new rashes.      She is going to start trying to become pregnant within the next 6 months. She would like her IUD removed.  She is going to [facility] in September 2023 and would like to have IUD removed when she returns.      Family History:  I have reviewed this patient's family history and updated it with pertinent information if needed.  Family History   Problem Relation Age of Onset     Asthma Mother      Eczema Mother      Diabetes Father      Hypertension Father      Arrhythmia Brother      Social History:  Occupation: Therapist at [employer]  Lives: [place], closing on a house soon  Smoking: never cigarettes  Other rec/illicit drug use: Used to smoke marijuana (no substances in the past 10 yrs)  Sexual activity: sexually active with her   Alcohol: 1 drink about 2-3 times per week  Has a younger brother and an older half brother    Medications:  Current Outpatient Medications   Medication     sertraline (ZOLOFT) 50 MG tablet     spironolactone (ALDACTONE) 25 MG tablet     No current facility-administered medications  for this visit.        Allergies:   No Known Allergies    Medical History:  Past Medical History:   Diagnosis Date     Acne vulgaris      Depression        Surgical History:  Past Surgical History:   Procedure Laterality Date     wisdom teeth      2006 and 2019            Review of Systems:     See HPI for pertinent ROS         Physical Exam:   /70 (BP Location: Right arm, Patient Position: Sitting, Cuff Size: Adult Regular)   Pulse 65   Temp 98.6  F (37  C) (Oral)   Ht 1.524 m (5')   Wt 57.7 kg (127 lb 1.6 oz)   SpO2 98%   BMI 24.82 kg/m    Body mass index is 24.82 kg/m .  Vitals were reviewed    Physical Exam:   General: Sitting upright, talking in complete sentences, non-distressed  HEENT: Normocephalic, atraumatic, PERRL, EOMI, no conjunctival pallor, anicteric, nares patent, oropharynx clear and moist  CVS: S1/S2 WNL, RRR, no murmur, no LE edema  Pulm: Non-labored breathing, clear to auscultation b/l, no crackles or wheeze  Abdo: Non-distended, non-tender to palpation, no rebound or guarding, BS present   MSK: No obvious bony deformities, no clubbing  Skin: Warm and dry, no obvious rashes  Neuro: Alert and oriented x3, non-focal, moves all extremities spontaneously  Psych: Normal mood and affect, very pleasant  : normal vulva, normal appearing vagina, no masses or lesions noted. Normal appearing cervix without discharge or erythema.     Assessment and Plan     Lidia García is a 32 year old female with PMH of Depression and Acne who presents for annual exam.     Health Maintenance (last wellness exam 5/9/23)  -Colonoscopy: n/a  -Mammogram: not yet needed  -Pap/HPV: Will repeat pap and HPV cotesting today 5/9/23  -STI/HIV: negative in 1/2019, repeated 5/9/23  -HCV: nonreactive in 3/2021  -DM: no risk factors, normal BMI  -HTN: controlled without medication  -Lipids: normal lipids in 3/2021 (Cholesterol 185, LDL 98, HDL 76 and TG 57)  -Smoking/AAA: nonsmoker  -EtOH: discussed moderation  -DEXA:  "n/a  -Immunizations: ordered HBV antibody testing    *Per [facility] OB/GYN note:  \"3/17/2021 LSIL, HPV NEG pap  4/2021 seen for postcotal bleeding, dysparuenia, LSIL pap  4/27/2021 gc/chl  4/27/2921 ADEQUATE COLPO  A. ECC:  Fragments of benign endocervix.   4/14/2022 PAP-LSIL, HPV + done by pcp  LSIL pap--initiallly HPV neg, 4/2022 HPV +  A/P Adequate Colposcopy, no cervical lesions seen grossly or on colpo  Iram'l hx of HPV discussed\"      She also had a small fibroid noted on US    Constipation  Fatigue  Weight gain  She has a history of constipation over 1.5 years.  She is also endorsed fatigue and about 15 pound weight gain.  Exam unremarkable.  Will check labs today.  Consider hypothyroidism, anemia, hypercalcemia, IBS versus other differential at this time.  While she endorses bloating and constipation, she has no abdominal pain and her abdominal exam is very reassuring. We discussed that Sertraline can cause some abdominal discomfort. We will try decreasing her Sertraline from 50 to 25 mg as she is wanting to see if she can come off her selective serotonin reuptake inhibitor anyway. I recommended she decrease the Sertraline to 25 mg for a couple of weeks. If no improvement, start Miralax as this has helped her before.   -Ordered CBC, BMP, HIV and TSH  -I will send a Vittana message with results and we can decide on f/u then    Other depression  -     Continue sertraline (ZOLOFT), decrease from 50 to 25 mg per day.     Acne vulgaris  -     Continue spironolactone (ALDACTONE) 25 MG tablet daily      RTC in may 2024 for annual exam      Additional Issues:  - remove her IUD when she returns from [place] in 9/2023    Options for treatment and follow-up care were reviewed with the patient. Lidia García engaged in the decision making process and verbalized understanding of the options discussed and agreed with the final plan.      Pt was seen and plan of care discussed with AISHA DUDLEY MD  Internal " Medicine-PGY2  [facility]  Pager: [phone]  May 9, 2023

## 2023-05-09 NOTE — PATIENT INSTRUCTIONS
Angie Murillo,    It was a pleasure seeing you in the clinic today. We discussed your fatigue and constipation. I ordered labs to check your blood count, thyroid, electrolytes and HIV. I will send you a my chart message with the results. Don't forget to decrease your sertraline to 25 mg daily to see if this helps with the constipation. Try doing that first for a few weeks. If that does not help, start Miralax again.     Please do not hesitate to reach out to me or schedule an appointment if you have any questions or concerns.     All the best,    Dr. Lobato

## 2023-05-09 NOTE — NURSING NOTE
Lidia García is a 32 year old female patient that presents today in clinic for the following:    Chief Complaint   Patient presents with     Physical     The patient's allergies and medications were reviewed as noted. A set of vitals were recorded as noted without incident: /70 (BP Location: Right arm, Patient Position: Sitting, Cuff Size: Adult Regular)   Pulse 65   Temp 98.6  F (37  C) (Oral)   Ht 1.524 m (5')   Wt 57.7 kg (127 lb 1.6 oz)   SpO2 98%   BMI 24.82 kg/m  . The patient does not have any other questions for the provider.    Camille Kan, EMT at 2:20 PM on 5/9/2023.  Primary care clinic: [phone]

## 2023-05-10 LAB
HBV CORE AB SERPL QL IA: NONREACTIVE
HBV SURFACE AB SERPL IA-ACNC: 0.01 M[IU]/ML
HBV SURFACE AB SERPL IA-ACNC: NONREACTIVE M[IU]/ML
HBV SURFACE AG SERPL QL IA: NONREACTIVE
HIV 1+2 AB+HIV1 P24 AG SERPL QL IA: NONREACTIVE

## 2023-05-11 LAB
BKR LAB AP GYN ADEQUACY: ABNORMAL
BKR LAB AP GYN INTERPRETATION: ABNORMAL
BKR LAB AP HPV REFLEX: ABNORMAL
BKR LAB AP PREVIOUS ABNL DX: ABNORMAL
BKR LAB AP PREVIOUS ABNORMAL: ABNORMAL
PATH REPORT.COMMENTS IMP SPEC: ABNORMAL
PATH REPORT.COMMENTS IMP SPEC: ABNORMAL
PATH REPORT.RELEVANT HX SPEC: ABNORMAL

## 2023-05-12 LAB
HUMAN PAPILLOMA VIRUS 16 DNA: NEGATIVE
HUMAN PAPILLOMA VIRUS 18 DNA: NEGATIVE
HUMAN PAPILLOMA VIRUS FINAL DIAGNOSIS: NORMAL
HUMAN PAPILLOMA VIRUS OTHER HR: NEGATIVE

## 2023-05-21 ENCOUNTER — HEALTH MAINTENANCE LETTER (OUTPATIENT)
Age: 33
End: 2023-05-21

## 2023-07-12 ENCOUNTER — PRE VISIT (OUTPATIENT)
Dept: SURGERY | Facility: CLINIC | Age: 33
End: 2023-07-12

## 2023-07-12 ENCOUNTER — OFFICE VISIT (OUTPATIENT)
Dept: SURGERY | Facility: CLINIC | Age: 33
End: 2023-07-12
Attending: NURSE PRACTITIONER
Payer: COMMERCIAL

## 2023-07-12 VITALS — HEART RATE: 61 BPM | SYSTOLIC BLOOD PRESSURE: 105 MMHG | DIASTOLIC BLOOD PRESSURE: 74 MMHG | OXYGEN SATURATION: 100 %

## 2023-07-12 DIAGNOSIS — K64.4 ANAL SKIN TAG: ICD-10-CM

## 2023-07-12 DIAGNOSIS — K64.8 INTERNAL HEMORRHOID: Primary | ICD-10-CM

## 2023-07-12 PROCEDURE — 99202 OFFICE O/P NEW SF 15 MIN: CPT

## 2023-07-12 ASSESSMENT — PAIN SCALES - GENERAL: PAINLEVEL: NO PAIN (0)

## 2023-07-12 NOTE — LETTER
2023       RE: Lidia García  [address]  [facility] [address]       Dear Colleague,    Thank you for referring your patient, Lidia García, to the [facility] COLON AND RECTAL SURGERY CLINIC [place] at [facility]. Please see a copy of my visit note below.    Colon and Rectal Surgery Consult Clinic Note    Date: 2023     Referring provider:  JUNIOR Escobar CNP  [address]  [place] [address]     RE: Lidia García  : 1990  GENARO: 2023    Lidia García is a very pleasant 33 year old female here with concerns for rectal bleeding.    Lidia reports a few years of rectal bleeding with bowel movements. She also does report skin tags but is not bothered these. She occasionally had a stinging sensation with bowel movements. She had constipation during this time, which she thinks was actually due to Sertraline. She weaned off Sertraline in the past few months and is completely off it now. Her bowel movements have significantly improved since this. She is no longer constipated. She has a soft/formed bowel movement once every 1-2 days. While on Sertraline, she would go many days between bowel movements. She also has not had any bleeding in about 2 months now.    No prior colonoscopy. No family history of colorectal cancer.    Physical Examination:  /74 (BP Location: Left arm, Patient Position: Sitting, Cuff Size: Adult Regular)   Pulse 61   SpO2 100%   General: alert, oriented, in no acute distress, sitting comfortably  HEENT: moist mucous membranes    Perianal external examination: Exam was chaperoned by Srikanth Nesbitt, EMT-P   Perianal skin: Intact with no excoriation or lichenification.  Lesions: No evidence of an external lesion, nodularity, or induration in the perianal region.  Eversion of buttocks: There was not evidence of an anal fissure. Details: N/A.  Skin tags or external hemorrhoids: Yes:  small tag in anterior midine, small wide-based tag in posterior midline with some hemorrhoidal component at its base.    Digital rectal examination: Was performed.   Sphincter tone: Good.  Palpable lesions: No.  Other: None.  Bimanual examination: was not performed    Anoscopy: Was performed.   Hemorrhoids: No significant internal hemorrhoids.  Lesions: No    Assessment/Plan: Lidia García is a 33 year old female with rectal bleeding, now resolved. Constipation likely due to Sertraline and resolved with discontinuation. Bleeding was likely from internal hemorrhoids exacerbated by the constipation. Recommended a daily fiber supplement. Return to clinic as needed if bleeding or pain return. Patient's questions were answered to her stated satisfaction and she is in agreement with this plan.     Medical history:  Past Medical History:   Diagnosis Date    Acne vulgaris     Depression        Surgical history:  Past Surgical History:   Procedure Laterality Date    wisdom teeth      2006 and 2019       Problem list:  Patient Active Problem List    Diagnosis Date Noted    Left temporomandibular joint disorder, unspecified 03/27/2023     Priority: Medium       Medications:  Current Outpatient Medications   Medication Sig Dispense Refill    spironolactone (ALDACTONE) 25 MG tablet Take 3 tablets (75 mg) by mouth daily 3 TABLETS DAILY 90 tablet 3    sertraline (ZOLOFT) 50 MG tablet Take 1 tablet (50 mg) by mouth daily 30 tablet 3       Allergies:  No Known Allergies    Family history:  Family History   Problem Relation Age of Onset    Asthma Mother     Eczema Mother     Diabetes Father     Hypertension Father     Arrhythmia Brother        Social history:  Social History     Tobacco Use    Smoking status: Never    Smokeless tobacco: Never   Substance Use Topics    Alcohol use: Yes     Alcohol/week: 3.0 standard drinks of alcohol     Types: 3 Standard drinks or equivalent per week    Marital status: .  Occupation:  therapist at [employer].    Nursing Notes:   Srikanth Nesbitt, EMT  7/12/2023  9:13 AM  Signed  Chief Complaint   Patient presents with    Consult       Vitals:    07/12/23 0911   BP: 105/74   BP Location: Left arm   Patient Position: Sitting   Cuff Size: Adult Regular   Pulse: 61   SpO2: 100%       There is no height or weight on file to calculate BMI.    Srikanth Nesbitt EMT-P         15 minutes spent on the date of encounter performing chart review, history and exam, documentation and further activities as noted above with an additional 2 minutes for anoscopy.           Again, thank you for allowing me to participate in the care of your patient.      Sincerely,    Alva Moe PA-C

## 2023-07-12 NOTE — NURSING NOTE
Chief Complaint   Patient presents with     Consult       Vitals:    07/12/23 0911   BP: 105/74   BP Location: Left arm   Patient Position: Sitting   Cuff Size: Adult Regular   Pulse: 61   SpO2: 100%       There is no height or weight on file to calculate BMI.    Srikanth Nesbitt EMT-P

## 2023-07-12 NOTE — PROGRESS NOTES
Colon and Rectal Surgery Consult Clinic Note    Date: 2023     Referring provider:  JUNIOR Escobar CNP  [address]  [place] [address]     RE: Lidia García  : 1990  GENARO: 2023    Lidia García is a very pleasant 33 year old female here with concerns for rectal bleeding.    Lidia reports a few years of rectal bleeding with bowel movements. She also does report skin tags but is not bothered these. She occasionally had a stinging sensation with bowel movements. She had constipation during this time, which she thinks was actually due to Sertraline. She weaned off Sertraline in the past few months and is completely off it now. Her bowel movements have significantly improved since this. She is no longer constipated. She has a soft/formed bowel movement once every 1-2 days. While on Sertraline, she would go many days between bowel movements. She also has not had any bleeding in about 2 months now.    No prior colonoscopy. No family history of colorectal cancer.    Physical Examination:  /74 (BP Location: Left arm, Patient Position: Sitting, Cuff Size: Adult Regular)   Pulse 61   SpO2 100%   General: alert, oriented, in no acute distress, sitting comfortably  HEENT: moist mucous membranes    Perianal external examination: Exam was chaperoned by INGRIS Motta-P   Perianal skin: Intact with no excoriation or lichenification.  Lesions: No evidence of an external lesion, nodularity, or induration in the perianal region.  Eversion of buttocks: There was not evidence of an anal fissure. Details: N/A.  Skin tags or external hemorrhoids: Yes: small tag in anterior midine, small wide-based tag in posterior midline with some hemorrhoidal component at its base.    Digital rectal examination: Was performed.   Sphincter tone: Good.  Palpable lesions: No.  Other: None.  Bimanual examination: was not performed    Anoscopy: Was performed.   Hemorrhoids: No significant internal  hemorrhoids.  Lesions: No    Assessment/Plan: Lidia García is a 33 year old female with rectal bleeding, now resolved. Constipation likely due to Sertraline and resolved with discontinuation. Bleeding was likely from internal hemorrhoids exacerbated by the constipation. Recommended a daily fiber supplement. Return to clinic as needed if bleeding or pain return. Patient's questions were answered to her stated satisfaction and she is in agreement with this plan.     Medical history:  Past Medical History:   Diagnosis Date     Acne vulgaris      Depression        Surgical history:  Past Surgical History:   Procedure Laterality Date     wisdom teeth      2006 and 2019       Problem list:  Patient Active Problem List    Diagnosis Date Noted     Left temporomandibular joint disorder, unspecified 03/27/2023     Priority: Medium       Medications:  Current Outpatient Medications   Medication Sig Dispense Refill     spironolactone (ALDACTONE) 25 MG tablet Take 3 tablets (75 mg) by mouth daily 3 TABLETS DAILY 90 tablet 3     sertraline (ZOLOFT) 50 MG tablet Take 1 tablet (50 mg) by mouth daily 30 tablet 3       Allergies:  No Known Allergies    Family history:  Family History   Problem Relation Age of Onset     Asthma Mother      Eczema Mother      Diabetes Father      Hypertension Father      Arrhythmia Brother        Social history:  Social History     Tobacco Use     Smoking status: Never     Smokeless tobacco: Never   Substance Use Topics     Alcohol use: Yes     Alcohol/week: 3.0 standard drinks of alcohol     Types: 3 Standard drinks or equivalent per week    Marital status: .  Occupation: therapist at [employer].    Nursing Notes:   Srikanth Nesbitt EMT  7/12/2023  9:13 AM  Signed  Chief Complaint   Patient presents with     Consult       Vitals:    07/12/23 0911   BP: 105/74   BP Location: Left arm   Patient Position: Sitting   Cuff Size: Adult Regular   Pulse: 61   SpO2: 100%       There is no height or weight  on file to calculate BMI.    Srikanth Nesbitt EMT-P         15 minutes spent on the date of encounter performing chart review, history and exam, documentation and further activities as noted above with an additional 2 minutes for anoscopy.     -----------------------------------------------------  Alva Moe PA-C  Colon and Rectal Surgery   [facility]

## 2023-08-16 DIAGNOSIS — L70.0 ACNE VULGARIS: ICD-10-CM

## 2023-08-18 NOTE — TELEPHONE ENCOUNTER
[facility] Call Center    Phone Message    May a detailed message be left on voicemail: yes     Reason for Call: Medication Refill Request    Has the patient contacted the pharmacy for the refill? Yes   Name of medication being requested:    Disp Refills Start End SHIKHA   spironolactone (ALDACTONE) 25 MG tablet           Provider who prescribed the medication: Dr Lobato  Pharmacy: [facility] [place], [place] - [address] [place] [address] [id number]    Date medication is needed: Today-  8/18/2023      Patient is going out of the country for vacation and absolutely needs this today!!    Action Taken: Message routed to:  [facility] ([facility]    Travel Screening: Not Applicable

## 2023-08-18 NOTE — TELEPHONE ENCOUNTER
Pharmacy called to follow up on this refill request, pt will be leaving out of the country this week and the pharmacy will be closed so pt is needing it sent to them today

## 2023-08-18 NOTE — TELEPHONE ENCOUNTER
spironolactone 25 mg tablet   Last office visit 5/9/2023  Last Future office visit None    Only a 120 days sent to pharm last order.  Would Provider like a 90 day supply = 270 tabs  Please review and send new order.       Jessica Lau RN  Central Triage Red Flags/Med Refills

## 2023-08-21 RX ORDER — SPIRONOLACTONE 25 MG/1
75 TABLET ORAL DAILY
Qty: 270 TABLET | Refills: 2 | Status: SHIPPED | OUTPATIENT
Start: 2023-08-21 | End: 2024-03-13

## 2023-09-28 ASSESSMENT — ANXIETY QUESTIONNAIRES
5. BEING SO RESTLESS THAT IT IS HARD TO SIT STILL: NOT AT ALL
7. FEELING AFRAID AS IF SOMETHING AWFUL MIGHT HAPPEN: MORE THAN HALF THE DAYS
1. FEELING NERVOUS, ANXIOUS, OR ON EDGE: MORE THAN HALF THE DAYS
2. NOT BEING ABLE TO STOP OR CONTROL WORRYING: SEVERAL DAYS
GAD7 TOTAL SCORE: 12
3. WORRYING TOO MUCH ABOUT DIFFERENT THINGS: MORE THAN HALF THE DAYS
6. BECOMING EASILY ANNOYED OR IRRITABLE: MORE THAN HALF THE DAYS
GAD7 TOTAL SCORE: 12
IF YOU CHECKED OFF ANY PROBLEMS ON THIS QUESTIONNAIRE, HOW DIFFICULT HAVE THESE PROBLEMS MADE IT FOR YOU TO DO YOUR WORK, TAKE CARE OF THINGS AT HOME, OR GET ALONG WITH OTHER PEOPLE: VERY DIFFICULT
4. TROUBLE RELAXING: NEARLY EVERY DAY

## 2023-09-29 ENCOUNTER — VIRTUAL VISIT (OUTPATIENT)
Dept: FAMILY MEDICINE | Facility: CLINIC | Age: 33
End: 2023-09-29
Payer: COMMERCIAL

## 2023-09-29 DIAGNOSIS — F41.9 ANXIETY: Primary | ICD-10-CM

## 2023-09-29 PROCEDURE — 99213 OFFICE O/P EST LOW 20 MIN: CPT | Mod: VID | Performed by: FAMILY MEDICINE

## 2023-09-29 RX ORDER — HYDROXYZINE HYDROCHLORIDE 10 MG/1
10-20 TABLET, FILM COATED ORAL 3 TIMES DAILY PRN
Qty: 90 TABLET | Refills: 3 | Status: SHIPPED | OUTPATIENT
Start: 2023-09-29

## 2023-09-29 RX ORDER — PROPRANOLOL HYDROCHLORIDE 10 MG/1
10 TABLET ORAL 3 TIMES DAILY PRN
Qty: 90 TABLET | Refills: 1 | Status: SHIPPED | OUTPATIENT
Start: 2023-09-29

## 2023-09-29 ASSESSMENT — ENCOUNTER SYMPTOMS: NERVOUS/ANXIOUS: 1

## 2023-09-29 NOTE — PROGRESS NOTES
Lidia is a 33 year old who is being evaluated via a billable video visit.      How would you like to obtain your AVS? MyChart  If the video visit is dropped, the invitation should be resent by: Text to cell phone: [phone]  Will anyone else be joining your video visit? No          Assessment & Plan     Anxiety  - propranolol (INDERAL) 10 MG tablet; Take 1 tablet (10 mg) by mouth 3 times daily as needed (anxiety)  - hydrOXYzine (ATARAX) 10 MG tablet; Take 1-2 tablets (10-20 mg) by mouth 3 times daily as needed for anxiety       Pranav Subramanian [facility]   Lidia is a 33 year old, presenting for the following health issues:  Anxiety      Anxiety    History of Present Illness       Mental Health Follow-up:  Patient presents to follow-up on Depression & Anxiety.Patient's depression since last visit has been:  Worse  The patient is having other symptoms associated with depression.  Patient's anxiety since last visit has been:  Worse  The patient is having other symptoms associated with anxiety.  Any significant life events: other  Patient is feeling anxious or having panic attacks.  Patient has no concerns about alcohol or drug use.    She eats 2-3 servings of fruits and vegetables daily.She consumes 1 sweetened beverage(s) daily.She exercises with enough effort to increase her heart rate 20 to 29 minutes per day.  She exercises with enough effort to increase her heart rate 3 or less days per week.   She is taking medications regularly.       Anxiety and depression for a long time   On sertraline was really helpful but some blunting of emotions   Discontinued a couple months ago due to chronic constipation.   Therapy helps a bit   In between therapist now  Is a therapist herself     Will get derailed by anxiety   Has not had to miss a day of work   Can compartmentalize well   Getting harder to do it now     Would like to consider taking something else as needed   Mom is  living with her currently   A bit shaky and a pit in her stomach   Thinks it would be helpful to get a break from the anxiety   Had xanax in the past and thinks it may have helped   Wondering if she should start that again     Passive suicidal ideation without plan or wanting to do it  Thoughts of just sliping away     Prozac never found a good dose   Lexapro gave her rage   Sertraline     For constipation tried miralax and stool softeners     Can't focus on worried thought   Vague sense of dread   A lot of crying           Review of Systems   Psychiatric/Behavioral:  The patient is nervous/anxious.           Objective           Vitals:  No vitals were obtained today due to virtual visit.    Physical Exam   GENERAL: Healthy, alert and no distress  EYES: Eyes grossly normal to inspection.  No discharge or erythema, or obvious scleral/conjunctival abnormalities.  RESP: No audible wheeze, cough, or visible cyanosis.  No visible retractions or increased work of breathing.    SKIN: Visible skin clear. No significant rash, abnormal pigmentation or lesions.  NEURO: Cranial nerves grossly intact.  Mentation and speech appropriate for age.  PSYCH: Mentation appears normal, affect normal/bright, judgement and insight intact, normal speech and appearance well-groomed.        Video-Visit Details    Type of service:  Video Visit     Originating Location (pt. Location): Home  Distant Location (provider location):  On-site  Platform used for Video Visit: Tomasa Jacobo [id number]

## 2024-01-08 ENCOUNTER — MYC MEDICAL ADVICE (OUTPATIENT)
Dept: INTERNAL MEDICINE | Facility: CLINIC | Age: 34
End: 2024-01-08
Payer: COMMERCIAL

## 2024-01-09 ENCOUNTER — VIRTUAL VISIT (OUTPATIENT)
Dept: INTERNAL MEDICINE | Facility: CLINIC | Age: 34
End: 2024-01-09
Payer: COMMERCIAL

## 2024-01-09 DIAGNOSIS — U07.1 INFECTION DUE TO 2019 NOVEL CORONAVIRUS: Primary | ICD-10-CM

## 2024-01-09 PROCEDURE — 99213 OFFICE O/P EST LOW 20 MIN: CPT | Mod: 95

## 2024-01-09 NOTE — LETTER
[facility] INTERNAL MEDICINE [address]  4TH FLOOR  [facility] [address] [phone]          January 9, 2024    RE:  Lidia García                                                                                                                                                       [address]  [facility] [address]      To whom it may concern:      Lidia García is under my professional care for COVID-19. She is recommended to continue isolating through 1/14/2024. She can continue to work remotely during this time.      Please reach out to our clinic with any questions or concerns.     Sincerely,    Melony Pizarro MD  [facility] [place] [facility]  [phone]

## 2024-01-09 NOTE — PATIENT INSTRUCTIONS
Thank you for visiting the Primary Care Center today at the [facility]! The following is some information about our clinic:     Primary Care Center Frequently-Asked Questions    (1) How do I schedule appointments at the [facility]?     Primary Care--to schedule or make changes to an existing appointment, please call our primary care line at [phone].    Labs--to schedule a lab appointment at the [facility] you can use [employer] or call [phone]. If you have a [place] location that is closer to home, you can reach out to that location for scheduling options.     Imaging--if you need to schedule a CT, X-ray, MRI, ultrasound, or other imaging study you can call [phone] to schedule at the [facility] or any other [facility] imaging location.     Referrals--if a referral to another specialty was ordered you can expect a phone call from their scheduling team. If you have not heard from them in a week, please call us or send us a [employer]Life message to check the status or get a scheduling number. Please note that this only applies to internal [facility] referrals. If the referral is external you would need to contact their office for scheduling.     (2) I have a question about my visit, who do I contact?     You can call us at the primary care line at [phone] to ask questions about your visit. You can also send a secure message through bLife, which is reviewed by clinic staff. Please note that [employer] messages have a twenty-four to forty-eight business hour turnaround time and should not be used for urgent concerns.    (3) How will I get the results of my tests?    If you are signed up for [facility]et[employer] all tests will be released to you within twenty-four hours of resulting. Please allow three to five days for your doctor to review your results and place a note interpreting the results. If you do not have [facility] you will receive your  results through mail seven to ten business days following the return of the tests. Please note that if there should be any urgent or concerning results that your doctor or their registered nurse will reach out to you the same day as the tests come back. If you have follow up questions about your results or would like to discuss the results in detail please schedule a follow up with your provider either in person or virtually.     (4) How do I get refills of my prescriptions?     You should always first contact your pharmacy for refills of your medications. If submitting a refill request on [employer], please be sure to submit the request only once--repeat requests can cause delays in refill. If you are requesting a NEW medication or a medication related to new symptoms you will need to schedule an appointment with a provider prior to approval. Please note: Routine medication refills have up to one to three business day turnaround whereas controlled substances refills have up to five to seven business day turnaround.    (5) I have new symptoms, what do I do?     If you are having an immediate medical emergency, you should dial 911 for assistance.   For anything urgent that needs to be seen within a few hours to one day you should visit a local urgent care for assistance.  For non-urgent symptoms that need to be seen within a few days to a week you can schedule with an available provider in primary care by going to [employer] or calling [phone].   If you are not sure how serious your symptoms are or you would like to receive medical advice you can always call [phone] to speak with a triage nurse.

## 2024-01-09 NOTE — PROGRESS NOTES
Virtual Visit Details    Type of service:  Video Visit     Originating Location (pt. Location): Home  Distant Location (provider location):  On-site  Platform used for Video Visit: Tomasa

## 2024-01-09 NOTE — Clinical Note
"1/9/2024       RE: Lidia García  [address]  [facility] [address]     Dear Colleague,    Thank you for referring your patient, Lidia García, to the [facility] INTERNAL MEDICINE [place] [facility]. Please see a copy of my visit note below.      PRIMARY CARE CENTER     Patient Name: Lidia Gracía  YOB: 1990  MRN: [id number]    Date of Service: January 9, 2024  Chief Complaint: \"covid positive 1/5/2024, discuss letter for work, congestion, cough, headache.  per encounter dated 1/8/2024\"         HISTORY OF PRESENT ILLNESS:    Lidia García is a 34 yo woman with PMHx  anxiety who presents to the clinic to request a letter for work in the setting of COVID infection.     Ms. García tested positive via a home test on 1/5; symptoms started ~1/4. She had symptoms of HA, low-grade fever, chills, congestion, sore throat, and cough. She is feeling overall better. She has some lingering minor cough, congestion, HA, and some SOB. Using Dayquil & Nyquil.     Write a letter to work remotely through Sunday. ***           PAST MEDICAL HISTORY:     Past Medical History:   Diagnosis Date    Acne vulgaris     Depression             REVIEW OF SYSTEMS:     10 point ROS was negative except as noted in HPI         HOME MEDICATIONS:     Current Outpatient Medications   Medication    hydrOXYzine (ATARAX) 10 MG tablet    propranolol (INDERAL) 10 MG tablet    spironolactone (ALDACTONE) 25 MG tablet     No current facility-administered medications for this visit.            PHYSICAL EXAM:   Vitals: There were no vitals taken for this visit. ***    Wt Readings from Last 4 Encounters:   05/09/23 57.7 kg (127 lb 1.6 oz)   02/28/23 58.4 kg (128 lb 11.2 oz)   02/10/23 54.4 kg (120 lb)     ***  GENERAL: Alert, interactive, NAD  HEENT: NCAT, EOMI, no conjunctivitis, anicteric sclera, tympanic membranes translucent with normal light reflex  LUNGS: clear to " "auscultation bilaterally, no crackles or wheezes  HEART: regular rate and rhythm, normal S1 and S2, no murmur appreciated  ABDOMEN: Soft, nontender, nondistended, no rebound or guarding.  MUSCULOSKELETAL: no tenderness to spinous processes, no chest wall tenderness  EXTREMITIES: No LE edema bilaterally, 2+ DP and radial pulses bialterally  SKIN: Warm and dry, no jaundice or acute rashes  NEURO: A&O, moving all 4 limbs spontaneously   PSYCH: Appropriate mood, normal speech, linear thought.            DATA:     Laboratory Data:  ***  Imaging:  ***          ASSESSMENT & PLAN:     There are no diagnoses linked to this encounter.    #Healthcare Maintenance:  ***    Return to clinic: ***    Patient care plan discussed with attending physician,  ***, who agreed with above.    Melony Pizarro MD  PGY-3  Internal Medicine      Virtual Visit Details    Type of service:  Video Visit     Originating Location (pt. Location): {video visit patient location:966179::\"Home\"}  {PROVIDER LOCATION On-site should be selected for visits conducted from your clinic location or adjoining [facility] hospital, academic office, or other nearby [facility] building. Off-site should be selected for all other provider locations, including home:757305}  Distant Location (provider location):  {virtual location provider:151744}  Platform used for Video Visit: {Virtual Visit Platforms:739299::\"[facility]\"}      Again, thank you for allowing me to participate in the care of your patient.      Sincerely,    Melony Pizarro MD    "

## 2024-01-09 NOTE — PROGRESS NOTES
"  PRIMARY CARE CENTER     Patient Name: Lidia García  YOB: 1990  MRN: [id number]    Date of Service: January 9, 2024  Chief Complaint: \"covid positive 1/5/2024, discuss letter for work, congestion, cough, headache.  per encounter dated 1/8/2024\"         HISTORY OF PRESENT ILLNESS:    Lidia García is a 34 yo woman with PMHx  anxiety who presents to the clinic to request a letter for work in the setting of COVID infection.     Ms. García strarted to have symptoms of fatigue and generally feeling unwell on 1/4 PM. She tested positive for COVID via a home test on 1/5. She had symptoms of HA, low-grade fever, chills, congestion, sore throat, and cough. She is feeling overall better. She has some lingering minor cough, congestion, HA, and some SOB. Using Dayquil & Nyquil. Denies fevers in the past 24 hours but has been using these medications. She took a home test again yesterday which was still positive. 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 She is worried about transmitting COVID to her patients and requests a letter to work remotely through Sunday (10-day point).            PAST MEDICAL HISTORY:     Past Medical History:   Diagnosis Date    Acne vulgaris     Depression             REVIEW OF SYSTEMS:     10 point ROS was negative except as noted in HPI         HOME MEDICATIONS:     Current Outpatient Medications   Medication    hydrOXYzine (ATARAX) 10 MG tablet    propranolol (INDERAL) 10 MG tablet    spironolactone (ALDACTONE) 25 MG tablet     No current facility-administered medications for this visit.            PHYSICAL EXAM:   Vitals: No vitals given virtual visit.     Wt Readings from Last 4 Encounters:   05/09/23 57.7 kg (127 lb 1.6 oz)   02/28/23 58.4 kg (128 lb 11.2 oz)   02/10/23 54.4 kg (120 lb)     GENERAL: Alert, interactive, NAD  HEENT: NCAT, EOMI  LUNGS: breathing comfortably on RA, mask in place  PSYCH: Appropriate mood, normal speech, linear thought.        "     DATA:     Laboratory Data & Imaging: No recent, relevant labs or imaging.            ASSESSMENT & PLAN:     Lidia was seen today for recheck and covid.    Diagnoses and all orders for this visit:    Infection due to 2019 novel coronavirus  Symptom onset 1/4, positive on home antigen test 1/5. Symptoms overall improving but does have ongoing symptoms and continuing to test positive on home antigen test. Given symptom onset >5 days ago, not a candidate for Paxlovid. Additionally, her symptoms are overall improving and are well-managed with OTC medications. Reviewed CDC isolation guidelines and recommended she request the [place]-specific policies for return to work. Given pt is concerned about transmitting COVID to her co-workers (works in healthcare), would like to have work letter to work remotely through 10-day isolation period.   - return to work letter provided (end isolation date: 1/14)   - continue supportive care for symptoms     Return to clinic: PRN    Patient care plan discussed with attending physician, Dr. Pitts, who agreed with above.    Melony Pizarro MD  PGY-3  Internal Medicine

## 2024-01-09 NOTE — NURSING NOTE
Is the patient currently in the state of [place]? YES    Visit mode:VIDEO    If the visit is dropped, the patient can be reconnected by: VIDEO VISIT: Send to e-mail at: [email or web address]    Will anyone else be joining the visit? NO  (If patient encounters technical issues they should call [phone] :[phone])    How would you like to obtain your AVS? MyChart    Are changes needed to the allergy or medication list? Pt stated no changes to allergies and Pt stated no med changes    Reason for visit: STAR and ANNE MARIE MORENOF

## 2024-03-13 ENCOUNTER — VIRTUAL VISIT (OUTPATIENT)
Dept: FAMILY MEDICINE | Facility: CLINIC | Age: 34
End: 2024-03-13
Payer: COMMERCIAL

## 2024-03-13 DIAGNOSIS — D25.9 UTERINE LEIOMYOMA, UNSPECIFIED LOCATION: ICD-10-CM

## 2024-03-13 DIAGNOSIS — F33.0 MILD EPISODE OF RECURRENT MAJOR DEPRESSIVE DISORDER (H): ICD-10-CM

## 2024-03-13 DIAGNOSIS — R87.612 LGSIL ON PAP SMEAR OF CERVIX: ICD-10-CM

## 2024-03-13 DIAGNOSIS — Z97.5 IUD (INTRAUTERINE DEVICE) IN PLACE: ICD-10-CM

## 2024-03-13 DIAGNOSIS — L70.0 ACNE VULGARIS: Primary | ICD-10-CM

## 2024-03-13 PROCEDURE — 99214 OFFICE O/P EST MOD 30 MIN: CPT | Mod: 95 | Performed by: FAMILY MEDICINE

## 2024-03-13 PROCEDURE — 96127 BRIEF EMOTIONAL/BEHAV ASSMT: CPT | Mod: 95 | Performed by: FAMILY MEDICINE

## 2024-03-13 RX ORDER — TRETINOIN 0.5 MG/G
CREAM TOPICAL AT BEDTIME
Qty: 45 G | Refills: 3 | Status: SHIPPED | OUTPATIENT
Start: 2024-03-13 | End: 2024-06-04

## 2024-03-13 RX ORDER — SPIRONOLACTONE 100 MG/1
100 TABLET, FILM COATED ORAL DAILY
Qty: 90 TABLET | Refills: 3 | Status: SHIPPED | OUTPATIENT
Start: 2024-03-13 | End: 2024-06-04

## 2024-03-13 ASSESSMENT — PATIENT HEALTH QUESTIONNAIRE - PHQ9
SUM OF ALL RESPONSES TO PHQ QUESTIONS 1-9: 4
10. IF YOU CHECKED OFF ANY PROBLEMS, HOW DIFFICULT HAVE THESE PROBLEMS MADE IT FOR YOU TO DO YOUR WORK, TAKE CARE OF THINGS AT HOME, OR GET ALONG WITH OTHER PEOPLE: SOMEWHAT DIFFICULT
SUM OF ALL RESPONSES TO PHQ QUESTIONS 1-9: 4

## 2024-03-13 ASSESSMENT — ANXIETY QUESTIONNAIRES
2. NOT BEING ABLE TO STOP OR CONTROL WORRYING: NOT AT ALL
GAD7 TOTAL SCORE: 4
7. FEELING AFRAID AS IF SOMETHING AWFUL MIGHT HAPPEN: SEVERAL DAYS
IF YOU CHECKED OFF ANY PROBLEMS ON THIS QUESTIONNAIRE, HOW DIFFICULT HAVE THESE PROBLEMS MADE IT FOR YOU TO DO YOUR WORK, TAKE CARE OF THINGS AT HOME, OR GET ALONG WITH OTHER PEOPLE: SOMEWHAT DIFFICULT
8. IF YOU CHECKED OFF ANY PROBLEMS, HOW DIFFICULT HAVE THESE MADE IT FOR YOU TO DO YOUR WORK, TAKE CARE OF THINGS AT HOME, OR GET ALONG WITH OTHER PEOPLE?: SOMEWHAT DIFFICULT
4. TROUBLE RELAXING: SEVERAL DAYS
1. FEELING NERVOUS, ANXIOUS, OR ON EDGE: SEVERAL DAYS
5. BEING SO RESTLESS THAT IT IS HARD TO SIT STILL: NOT AT ALL
6. BECOMING EASILY ANNOYED OR IRRITABLE: SEVERAL DAYS
GAD7 TOTAL SCORE: 4
GAD7 TOTAL SCORE: 4
7. FEELING AFRAID AS IF SOMETHING AWFUL MIGHT HAPPEN: SEVERAL DAYS
3. WORRYING TOO MUCH ABOUT DIFFERENT THINGS: NOT AT ALL

## 2024-03-13 NOTE — PROGRESS NOTES
Lidia is a 33 year old who is being evaluated via a billable video visit.          Assessment & Plan     1. Acne vulgaris  Plan: advised to increase the dose of spironolactone to 100mg  Ok to add retin A   Wear sun screen consistently  Offered derm consultation and patient deferred for now    - spironolactone (ALDACTONE) 100 MG tablet; Take 1 tablet (100 mg) by mouth daily  Dispense: 90 tablet; Refill: 3  - tretinoin (RETIN-A) 0.05 % external cream; Apply topically at bedtime  Dispense: 45 g; Refill: 3    Potential medication side effects were discussed with the patient; let me know if any occur.    2. IUD (intrauterine device) in place  Plan: discussed BTB/ spotting on merina- at the end of 5 yrs- not uncommon.  She is comfortable monitoring it  She is also considering to start family and will make appointment when ready for IUD removal     3. Uterine leiomyoma, unspecified location  Plan: Monitor- ULTRASOUND reviewed from  3/25/21-    4. Mild episode of recurrent major depressive disorder (H24)  Plan:      3/13/2024    11:55 AM   PHQ   PHQ-9 Total Score 4   Q9: Thoughts of better off dead/self-harm past 2 weeks Not at all   Managing well on own- with regular  excercise and therapy      5. LSIL  Plan: reminder given to patient to schedule pap smear  in may 2024  Prescription drug management  I spent a total of 32 minutes on the day of the visit.   Time spent by me doing chart review, history and exam, documentation and further activities per the note            Subjective   Lidia is a 33 year old, presenting for the following health issues:  Acne  Feels that acne is acting out  Spironolactone has helped over the years and not so much lately  New acne on forehead  History of Teenage acne from  age [age] & resolved.  Now for past 10 yrs again acne, Seems hormonal .    IUD/merina placed in [place] in 2019  Was having heavy periods and needed it for family planning  It helped with heavy mensuration and now for past  few months - noticing unpredictable periods/spotting- sometimes twice a month      04/2022- Low grade squamous intraepithelial lesion.    History of depression - therapy regularly helps a lot  sertraline helped for 2 yrs,stopped about a yr ago  due to constipation      History of Present Illness       Reason for visit:  Acne    She eats 2-3 servings of fruits and vegetables daily.She consumes 1 sweetened beverage(s) daily.She exercises with enough effort to increase her heart rate 20 to 29 minutes per day.  She exercises with enough effort to increase her heart rate 5 days per week.   She is taking medications regularly.               Review of Systems  Constitutional, neuro, ENT, endocrine, pulmonary, cardiac, gastrointestinal, genitourinary, musculoskeletal, integument and psychiatric systems are negative, except as otherwise noted.      Objective           Vitals:  No vitals were obtained today due to virtual visit.    Physical Exam   GENERAL: alert and no distress  EYES: Eyes grossly normal to inspection.  No discharge or erythema, or obvious scleral/conjunctival abnormalities.  RESP: No audible wheeze, cough, or visible cyanosis.    SKIN: Visible skin clear. No significant rash, abnormal pigmentation or lesions.  NEURO: Cranial nerves grossly intact.  Mentation and speech appropriate for age.  PSYCH: Appropriate affect, tone, and pace of words          Video-Visit Details    Type of service:  Video Visit   Originating Location (pt. Location): Home    Distant Location (provider location):  On-site  Platform used for Video Visit: Tomasa  Signed Electronically by: Yumiko Rodriguez MD

## 2024-03-13 NOTE — PROGRESS NOTES
"Lidia is a 33 year old who is being evaluated via a billable video visit.    {ROOMING STAFF complete during rooming of virtual visit (Optional):617735}  {If patient encounters technical issues they should call [phone] :110000}    {PROVIDER CHARTING PREFERENCE:721410}    Subjective   Lidia is a 33 year old, presenting for the following health issues:  Acne  {(!) Visit Details have not yet been documented.  Please enter Visit Details and then use this list to pull in documentation. (Optional):385814}  History of Present Illness       Reason for visit:  Acne    She eats 2-3 servings of fruits and vegetables daily.She consumes 1 sweetened beverage(s) daily.She exercises with enough effort to increase her heart rate 20 to 29 minutes per day.  She exercises with enough effort to increase her heart rate 5 days per week.   She is taking medications regularly.       {SUPERLIST (Optional):683989}  {additonal problems for provider to add (Optional):824810}    {ROS Picklists (Optional):933782}      Objective           Vitals:  No vitals were obtained today due to virtual visit.    Physical Exam   {video visit exam brief selected:250673}    {Diagnostic Test Results (Optional):726457}      Video-Visit Details    Type of service:  Video Visit   Originating Location (pt. Location): {video visit patient location:973090::\"Home\"}  {PROVIDER LOCATION On-site should be selected for visits conducted from your clinic location or adjoining [facility] hospital, academic office, or other nearby [facility] building. Off-site should be selected for all other provider locations, including home:168482}  Distant Location (provider location):  {virtual location provider:758955}  Platform used for Video Visit: {Virtual Visit Platforms:922517::\"[employer]\"}  Signed Electronically by: Yumiko Rodriguez MD  {Email feedback regarding this note to [email or web address]   :488130}  "

## 2024-03-13 NOTE — PROGRESS NOTES
"Lidia is a 33 year old who is being evaluated via a billable video visit.    How would you like to obtain your AVS? {AVS Preference:192557}  If the video visit is dropped, the invitation should be resent by: {video visit invitation (Optional) :133862}  Will anyone else be joining your video visit? {:563944}  {If patient encounters technical issues they should call [phone] :393733}    {PROVIDER CHARTING PREFERENCE:995530}    Subjective   Lidia is a 33 year old, presenting for the following health issues:  No chief complaint on file.  {(!) Visit Details have not yet been documented.  Please enter Visit Details and then use this list to pull in documentation. (Optional):622633}  History of Present Illness       Reason for visit:  Acne    She eats 2-3 servings of fruits and vegetables daily.She consumes 1 sweetened beverage(s) daily.She exercises with enough effort to increase her heart rate 20 to 29 minutes per day.  She exercises with enough effort to increase her heart rate 5 days per week.   She is taking medications regularly.       {SUPERLIST (Optional):200246}  {additonal problems for provider to add (Optional):279276}    {ROS Picklists (Optional):917460}      Objective           Vitals:  No vitals were obtained today due to virtual visit.    Physical Exam   {video visit exam brief selected:866085}    {Diagnostic Test Results (Optional):679954}      Video-Visit Details    Type of service:  Video Visit   Originating Location (pt. Location): {video visit patient location:358075::\"Home\"}  {PROVIDER LOCATION On-site should be selected for visits conducted from your clinic location or adjoining [facility] hospital, academic office, or other nearby [facility] building. Off-site should be selected for all other provider locations, including home:805799}  Distant Location (provider location):  {virtual location provider:492662}  Platform used for Video Visit: {Virtual Visit Platforms:551813::\"[employer]\"}  Signed Electronically " by: Yumiko Rodriguez MD  {Email feedback regarding this note to [email or web address]   :687375}

## 2024-05-30 SDOH — HEALTH STABILITY: PHYSICAL HEALTH: ON AVERAGE, HOW MANY MINUTES DO YOU ENGAGE IN EXERCISE AT THIS LEVEL?: 30 MIN

## 2024-05-30 SDOH — HEALTH STABILITY: PHYSICAL HEALTH: ON AVERAGE, HOW MANY DAYS PER WEEK DO YOU ENGAGE IN MODERATE TO STRENUOUS EXERCISE (LIKE A BRISK WALK)?: 5 DAYS

## 2024-05-30 ASSESSMENT — SOCIAL DETERMINANTS OF HEALTH (SDOH): HOW OFTEN DO YOU GET TOGETHER WITH FRIENDS OR RELATIVES?: ONCE A WEEK

## 2024-06-04 ENCOUNTER — OFFICE VISIT (OUTPATIENT)
Dept: INTERNAL MEDICINE | Facility: CLINIC | Age: 34
End: 2024-06-04
Payer: COMMERCIAL

## 2024-06-04 VITALS
WEIGHT: 121.7 LBS | HEIGHT: 60 IN | BODY MASS INDEX: 23.89 KG/M2 | HEART RATE: 57 BPM | DIASTOLIC BLOOD PRESSURE: 72 MMHG | OXYGEN SATURATION: 99 % | SYSTOLIC BLOOD PRESSURE: 107 MMHG

## 2024-06-04 DIAGNOSIS — D22.9 MELANOCYTIC NEVUS, UNSPECIFIED LOCATION: ICD-10-CM

## 2024-06-04 DIAGNOSIS — L70.0 ACNE VULGARIS: ICD-10-CM

## 2024-06-04 DIAGNOSIS — Z00.00 HEALTHCARE MAINTENANCE: Primary | ICD-10-CM

## 2024-06-04 DIAGNOSIS — Z97.5 IUD (INTRAUTERINE DEVICE) IN PLACE: ICD-10-CM

## 2024-06-04 PROCEDURE — 87624 HPV HI-RISK TYP POOLED RSLT: CPT | Performed by: INTERNAL MEDICINE

## 2024-06-04 PROCEDURE — G0145 SCR C/V CYTO,THINLAYER,RESCR: HCPCS | Performed by: INTERNAL MEDICINE

## 2024-06-04 PROCEDURE — 99000 SPECIMEN HANDLING OFFICE-LAB: CPT | Performed by: PATHOLOGY

## 2024-06-04 PROCEDURE — 99395 PREV VISIT EST AGE 18-39: CPT | Mod: GE

## 2024-06-04 RX ORDER — SPIRONOLACTONE 100 MG/1
100 TABLET, FILM COATED ORAL DAILY
Qty: 90 TABLET | Refills: 3 | Status: SHIPPED | OUTPATIENT
Start: 2024-06-04

## 2024-06-04 RX ORDER — TRETINOIN 0.5 MG/G
CREAM TOPICAL AT BEDTIME
Qty: 45 G | Refills: 3 | Status: SHIPPED | OUTPATIENT
Start: 2024-06-04

## 2024-06-04 NOTE — PROGRESS NOTES
"Preventive Care Visit  [facility]  Gregoria Lobato MD, Internal Medicine  Jun 4, 2024      Assessment & Plan     Lidia García is a 33 year old female with PMH of Depression and Acne who presents for annual exam.      Health Maintenance (last wellness exam 5/9/23)  -Colonoscopy: n/a  -Mammogram: not yet needed  -Pap/HPV: Repeated pap and HPV cotesting today 6/4/24, see additional hx below  -STI/HIV: negative in 1/2019, repeated 5/9/23  -HCV: nonreactive in 3/2021 at [facility]  -DM: no risk factors, normal BMI  -HTN: controlled without medication  -Lipids: normal lipids in 3/2021 (Cholesterol 185, LDL 98, HDL 76 and TG 57)  -Smoking/AAA: nonsmoker  -EtOH: discussed moderation  -DEXA: n/a  -Immunizations: Need to order Hep B vaccine during next visit. She had negative titers in 5/2023.      Pap smear hx (per [facility]):  - 3/17/2021 LSIL, HPV NEG pap  - 4/2021 seen for postcotal bleeding, dysparuenia, LSIL pap  - 4/27/2021 gc/chl negative, ADEQUATE COLPOSCOPY  A. ECC:  Fragments of benign endocervix.   - 4/14/2022 PAP-LSIL, HPV + done by pcp  LSIL pap--initiallly HPV neg, 4/2022 HPV +  A/P Adequate Colposcopy, no cervical lesions seen grossly or on colpo  Iram'l hx of HPV discussed\" She also had a small fibroid noted on US  - 5/9/2023 Pap done by PCP (Dr. Lobato at [facility]) showed LSIL/CIN1.      IUD in place  - Ordered OB/GYN referral for IUD removal. She can also establish with them to discuss family planning as well.     Depression  Anxiety  No longer taking the Sertraline. Continue Atarax as needed.      Acne vulgaris  -     Continue spironolactone (ALDACTONE) 100 MG tablet daily  -     Continue Tretinoin 0.05% cream at bedtime.   -     Discussed that she needs to STOP both of above medications when she gets her IUD removed for family planning and risks of affecting the fetus.     Melanocytic Nevus  She has a  dark nevus about 1mm in size on her R " lateral 5th digit. We discussed ABCDE's. It appears similar shape and size to the other moles on her body, just darker pigment. While I do believe this appears benign, offered dermatology referral for further evaluation. Patient declined referral and will continue to monitor. Pt agreed that she will seek further evaluation if increasing size, asymmetry or further concerning changes.      Counseling  Appropriate preventive services were discussed with this patient, including applicable screening as appropriate for fall prevention, nutrition, physical activity, Tobacco-use cessation, weight loss and cognition.  Checklist reviewing preventive services available has been given to the patient.  Reviewed patient's diet, addressing concerns and/or questions.   The patient was instructed to see the dentist every 6 months.   She is at risk for psychosocial distress and has been provided with information to reduce risk.     RTC in may June 2025 for annual exam.     Case discussed with Dr. Víctor Yoder MD.    Gregoria Lobato MD  Internal Medicine-PGY3  [facility]      --------------------------------------------------------------------------------------------------------------------------    Subjective   Lidia García is a 33 year old female with PMH of Depression and Acne who presents for annual exam.     She has a mole on her Right 5th finger on the side. She thinks it may be getting darker? It has not increased in size. Not painful. Looks about the same size as her other moles. No fhx of skin cancer. She uses SPF 50 on her face, does not usually use sunscreen on her body.     She would like to discuss family planning. She has a Mirena IUD. She had it inserted in 2018. She has had increased PMS sxs and acne. She wants to try having a child in the next few months. She would like to have her IUD removed soon.     Denies fatigue,  fevers, chills, CP, SOB, abd pain, N/V/D and BLE edema, no melena, hematochezia,  nigh sweats, hemoptysis.       6/4/2024     1:40 PM   Additional Questions   Roomed by Lulú WALDEN            5/30/2024   General Health   How would you rate your overall physical health? Good   Feel stress (tense, anxious, or unable to sleep) Only a little   (!) STRESS CONCERN      5/30/2024   Nutrition   Three or more servings of calcium each day? Yes   Diet: Vegetarian/vegan   How many servings of fruit and vegetables per day? (!) 2-3   How many sweetened beverages each day? 0-1         5/30/2024   Exercise   Days per week of moderate/strenous exercise 5 days   Average minutes spent exercising at this level 30 min         5/30/2024   Social Factors   Frequency of gathering with friends or relatives Once a week   Worry food won't last until get money to buy more No   Food not last or not have enough money for food? No   Do you have housing?  Yes   Are you worried about losing your housing? No   Lack of transportation? No   Unable to get utilities (heat,electricity)? No         5/30/2024   Dental   Dentist two times every year? (!) NO         5/30/2024   TB Screening   Were you born outside of the [place]? No         Today's PHQ-9 Score:       3/13/2024    11:55 AM   PHQ-9 SCORE   PHQ-9 Total Score MyChart 4 (Minimal depression)   PHQ-9 Total Score 4           5/30/2024   Substance Use   Alcohol more than 3/day or more than 7/wk No   Do you use any other substances recreationally? (!) CANNABIS PRODUCTS     Social History     Tobacco Use    Smoking status: Never    Smokeless tobacco: Never   Vaping Use    Vaping status: Never Used   Substance Use Topics    Alcohol use: Yes     Alcohol/week: 3.0 standard drinks of alcohol     Types: 3 Standard drinks or equivalent per week    Drug use: Never             5/30/2024   Breast Cancer Screening   Family history of breast, colon, or ovarian cancer? No / Unknown              5/30/2024   STI Screening   New sexual partner(s) since last STI/HIV test? No     History of abnormal Pap  smear: YES - reflected in Problem List and Health Maintenance accordingly        Latest Ref Rng & Units 5/9/2023     2:51 PM   PAP / HPV   PAP  Low-grade squamous intraepithelial lesion (LSIL) encompassing HPV/mild dysplasia/CIN1    HPV 16 DNA Negative Negative    HPV 18 DNA Negative Negative    Other HR HPV Negative Negative            5/30/2024   Contraception/Family Planning   Questions about contraception or family planning (!) YES discusses IUD removal. Also ordered OB/GYN referral for IUD referral and to discuss family planning        Reviewed and updated as needed this visit by Provider                        Review of Systems  See pertinent ROS in HPI     Objective    Exam  /72 (BP Location: Right arm, Patient Position: Sitting, Cuff Size: Adult Regular)   Pulse 57   Ht 1.524 m (5')   Wt 55.2 kg (121 lb 11.2 oz)   SpO2 99%   BMI 23.77 kg/m     Estimated body mass index is 23.77 kg/m  as calculated from the following:    Height as of this encounter: 1.524 m (5').    Weight as of this encounter: 55.2 kg (121 lb 11.2 oz).    Physical Exam  General: Sitting upright, talking in complete sentences, non-distressed  HEENT: Normocephalic, atraumatic, PERRL, EOMI, no conjunctival pallor, anicteric, nares patent, oropharynx clear and moist, Clear TM b/l, no cervical or supraclavicular LAD  CVS: S1/S2 WNL, RRR, no murmur, no BLE edema  Pulm: Non-labored breathing, clear to auscultation b/l, no crackles or wheeze  Abdo: Non-distended, non-tender to palpation, no rebound or guarding, BS present, no HSM  MSK: No obvious bony deformities, no clubbing  Skin: Warm and dry, no obvious rashes  Neuro: Alert and oriented x3, non-focal, moves all extremities spontaneously  Psych: Normal mood and affect, very pleasant  : normal vulva, normal appearing vagina, no masses or lesions noted. Normal appearing cervix without erythema, minimal amount of blood on cervix (pt is on her period today). Mild amount of blood in  vaginal vault  Skin: 1mm hyperpigmented macule on her R lateral 5th digit. No other rashes noted.       Signed Electronically by: Gregoria Lobato MD

## 2024-06-04 NOTE — PATIENT INSTRUCTIONS
Angie Mruillo,    Please STOP tretinoin and Spironolactone when you decide to start trying to have children. You can try using Benzoyl peroxide or salicylic acid which are over the counter during pregnancy.     Take care,    Dr. Lobato

## 2024-06-05 LAB
HPV HR 12 DNA CVX QL NAA+PROBE: NEGATIVE
HPV16 DNA CVX QL NAA+PROBE: NEGATIVE
HPV18 DNA CVX QL NAA+PROBE: NEGATIVE
HUMAN PAPILLOMA VIRUS FINAL DIAGNOSIS: NORMAL

## 2024-06-07 LAB
BKR LAB AP GYN ADEQUACY: NORMAL
BKR LAB AP GYN INTERPRETATION: NORMAL
BKR LAB AP PREVIOUS ABNL DX: NORMAL
BKR LAB AP PREVIOUS ABNORMAL: NORMAL
PATH REPORT.COMMENTS IMP SPEC: NORMAL
PATH REPORT.COMMENTS IMP SPEC: NORMAL
PATH REPORT.RELEVANT HX SPEC: NORMAL

## 2024-11-20 PROBLEM — L85.8 KERATOSIS PILARIS: Status: ACTIVE | Noted: 2021-03-17

## 2024-11-20 PROBLEM — L30.9 ECZEMA: Status: ACTIVE | Noted: 2021-03-17

## 2024-11-20 PROBLEM — F33.0 MILD EPISODE OF RECURRENT MAJOR DEPRESSIVE DISORDER (H): Status: ACTIVE | Noted: 2021-10-05

## 2024-11-20 PROBLEM — G43.909 MIGRAINE WITHOUT STATUS MIGRAINOSUS, NOT INTRACTABLE: Status: ACTIVE | Noted: 2021-10-05

## 2024-11-20 ASSESSMENT — ANXIETY QUESTIONNAIRES
3. WORRYING TOO MUCH ABOUT DIFFERENT THINGS: MORE THAN HALF THE DAYS
IF YOU CHECKED OFF ANY PROBLEMS ON THIS QUESTIONNAIRE, HOW DIFFICULT HAVE THESE PROBLEMS MADE IT FOR YOU TO DO YOUR WORK, TAKE CARE OF THINGS AT HOME, OR GET ALONG WITH OTHER PEOPLE: VERY DIFFICULT
GAD7 TOTAL SCORE: 13
2. NOT BEING ABLE TO STOP OR CONTROL WORRYING: MORE THAN HALF THE DAYS
4. TROUBLE RELAXING: MORE THAN HALF THE DAYS
5. BEING SO RESTLESS THAT IT IS HARD TO SIT STILL: NOT AT ALL
8. IF YOU CHECKED OFF ANY PROBLEMS, HOW DIFFICULT HAVE THESE MADE IT FOR YOU TO DO YOUR WORK, TAKE CARE OF THINGS AT HOME, OR GET ALONG WITH OTHER PEOPLE?: VERY DIFFICULT
7. FEELING AFRAID AS IF SOMETHING AWFUL MIGHT HAPPEN: NEARLY EVERY DAY
GAD7 TOTAL SCORE: 13
GAD7 TOTAL SCORE: 13
7. FEELING AFRAID AS IF SOMETHING AWFUL MIGHT HAPPEN: NEARLY EVERY DAY
1. FEELING NERVOUS, ANXIOUS, OR ON EDGE: NEARLY EVERY DAY
6. BECOMING EASILY ANNOYED OR IRRITABLE: SEVERAL DAYS

## 2024-11-20 ASSESSMENT — PATIENT HEALTH QUESTIONNAIRE - PHQ9
10. IF YOU CHECKED OFF ANY PROBLEMS, HOW DIFFICULT HAVE THESE PROBLEMS MADE IT FOR YOU TO DO YOUR WORK, TAKE CARE OF THINGS AT HOME, OR GET ALONG WITH OTHER PEOPLE: SOMEWHAT DIFFICULT
SUM OF ALL RESPONSES TO PHQ QUESTIONS 1-9: 5
SUM OF ALL RESPONSES TO PHQ QUESTIONS 1-9: 5

## 2024-11-20 NOTE — PROGRESS NOTES
"CC: Establish Care (Discuss family planning /Possible IUD removal )      SUBJECTIVE: Lidia is a 34 year old female who comes in to establish care. She had her annual physical on 6/4/2024 with Dr. Lobato.     IUD in place and family planning.   - Mirena IUD since 2018 or 2019  - Ready to get IUD out and try to get pregnant  - Prior to IUD believes she had monthly cycles   - Not currently taking a prenatal vitamin   - Hep B non-immune per labs on 5/9/2023  - Pap smear UTD    Acne.   - Treating with spironolactone 100 mg daily and Retin-A cream at bedtime   - Plans to stop today with IUD removal     Migraine without aura.   - Wonders if mixed migraine and tension headaches?  - Headaches about 1x month  - Associated with nausea and light sensitivity     Depression and anxiety.  - Used to be on sertraline which was very helpful but then started to have constipation which resolved when she stopped sertraline  - Also has hydroxyzine and propranolol prn  - Would like a refill of hydroxyzine, used once every week or two  - Propranolol wasn't as helpful and it caused some dizziness  - Currently seeing a therapist    PMH, PSH, FH, allergies, and medications reviewed and updated in Epic.     SH: No tobacco use. A few drinks per week for alcohol use. No drug use. Occupation: Works as a therapist in private practice. Relationships: , her  is a writer. They are both originally from [place] but met in [place].       OBJECTIVE:   /64   Pulse 69   Temp 97.3  F (36.3  C)   Ht 1.53 m (5' 0.24\")   Wt 56.2 kg (124 lb)   SpO2 99%   BMI 24.03 kg/m    Alert and oriented in no acute distress.    PROCEDURE:    A speculum exam was performed and the cervix was visualized. The IUD string was visualized. Using ring forceps, the string  was grasped and the IUD removed intact.    POST PROCEDURE:    The patient tolerated the procedure well. Patient was discharged in stable condition.    Call if bleeding, pain or fever " occur. and Pregnancy counseling given, including folic acid supplementation 800-1000 mg per day.    Component      Latest Ref Rng 5/9/2023  3:22 PM   Hepatitis B Surface Antibody Instrument Value      <8.00 m[IU]/mL 0.01    Hepatitis B Surface Antibody Nonreactive      Answers submitted by the patient for this visit:  Patient Health Questionnaire (Submitted on 11/20/2024)  If you checked off any problems, how difficult have these problems made it for you to do your work, take care of things at home, or get along with other people?: Somewhat difficult  PHQ9 TOTAL SCORE: 5  Patient Health Questionnaire (G7) (Submitted on 11/20/2024)  MIKE 7 TOTAL SCORE: 13      ASSESSMENT/PLAN:   Lidia was seen today for establish care.    Encounter to establish care  PMH, PSH, FH, SH, medications, and allergies reviewed and updated in Epic.     Anxiety  The current medical regimen is effective;  continue present plan and medications.  -     hydrOXYzine HCl (ATARAX) 10 MG tablet; Take 1-2 tablets (10-20 mg) by mouth 3 times daily as needed for anxiety.    Nonimmune to hepatitis B virus  Hep B vaccine series started today.   -     HEPATITIS B, ADULT 20+ (ENGERIX-B/RECOMBIVAX HB)    Encounter for IUD removal  See procedure note above.   -     REMOVE INTRAUTERINE DEVICE    Pre-conception counseling  Advised to start prenatal vitamin. Advised to stop spironolactone and Retin-A. If she has not conceived in 6 mo or if she is having anovulatory cycles, advised to schedule with Ob/Gyn for further workup.       Regine Fuller MD  Family Medicine

## 2024-11-21 ENCOUNTER — OFFICE VISIT (OUTPATIENT)
Dept: FAMILY MEDICINE | Facility: CLINIC | Age: 34
End: 2024-11-21
Payer: COMMERCIAL

## 2024-11-21 VITALS
DIASTOLIC BLOOD PRESSURE: 64 MMHG | SYSTOLIC BLOOD PRESSURE: 109 MMHG | BODY MASS INDEX: 24.35 KG/M2 | HEIGHT: 60 IN | OXYGEN SATURATION: 99 % | WEIGHT: 124 LBS | HEART RATE: 69 BPM | TEMPERATURE: 97.3 F

## 2024-11-21 DIAGNOSIS — Z78.9 NONIMMUNE TO HEPATITIS B VIRUS: ICD-10-CM

## 2024-11-21 DIAGNOSIS — Z30.432 ENCOUNTER FOR IUD REMOVAL: ICD-10-CM

## 2024-11-21 DIAGNOSIS — F41.9 ANXIETY: ICD-10-CM

## 2024-11-21 DIAGNOSIS — Z76.89 ENCOUNTER TO ESTABLISH CARE: Primary | ICD-10-CM

## 2024-11-21 DIAGNOSIS — Z31.69 PRE-CONCEPTION COUNSELING: ICD-10-CM

## 2024-11-21 RX ORDER — HYDROXYZINE HYDROCHLORIDE 10 MG/1
10-20 TABLET, FILM COATED ORAL 3 TIMES DAILY PRN
Qty: 90 TABLET | Refills: 3 | Status: SHIPPED | OUTPATIENT
Start: 2024-11-21

## 2024-11-21 NOTE — PATIENT INSTRUCTIONS
- Start a prenatal vitamin  - Stop spironolactone and Retin-A  - Hepatitis B vaccine series to start today     - Ob/Gyn   [facility] [address]  [address] [place] [address]  [phone] Appointments

## 2024-12-04 DIAGNOSIS — F41.9 ANXIETY: ICD-10-CM

## 2024-12-04 RX ORDER — HYDROXYZINE HYDROCHLORIDE 10 MG/1
10-20 TABLET, FILM COATED ORAL 3 TIMES DAILY PRN
Qty: 90 TABLET | Refills: 3 | Status: SHIPPED | OUTPATIENT
Start: 2024-12-04

## 2025-01-25 ENCOUNTER — MYC MEDICAL ADVICE (OUTPATIENT)
Dept: FAMILY MEDICINE | Facility: CLINIC | Age: 35
End: 2025-01-25

## 2025-01-27 NOTE — TELEPHONE ENCOUNTER
Completed pt's form. Obtained Dr. Fuller's signature and left at  for pt to . Notified by  message.    CARRI Cooley, RN  01/29/25, 2:36 PM

## 2025-02-04 ENCOUNTER — MEDICAL CORRESPONDENCE (OUTPATIENT)
Dept: HEALTH INFORMATION MANAGEMENT | Facility: CLINIC | Age: 35
End: 2025-02-04
Payer: COMMERCIAL

## 2025-03-26 ENCOUNTER — TELEPHONE (OUTPATIENT)
Dept: OBGYN | Facility: CLINIC | Age: 35
End: 2025-03-26
Payer: COMMERCIAL

## 2025-03-26 DIAGNOSIS — O21.9 NAUSEA AND VOMITING IN PREGNANCY: Primary | ICD-10-CM

## 2025-03-26 RX ORDER — PYRIDOXINE HCL (VITAMIN B6) 25 MG
25 TABLET ORAL EVERY 8 HOURS PRN
Qty: 30 TABLET | Refills: 1 | Status: SHIPPED | OUTPATIENT
Start: 2025-03-26

## 2025-03-26 NOTE — TELEPHONE ENCOUNTER
S-(situation): Spoke with Lidia about her prenatal N/V.    B-(background): LMP 2/16/25 - 5w2d GA.    A-(assessment): Over the last couple of days, Lidia's first trimester nausea and vomiting has come in full force. 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     No signs of dehydration, and she is currently able to keep down small amounts of food and water.    R-(recommendations): Discussed various non-pharmacological and pharmacological interventions for N/V in early pregnancy. Also sent this info over [facility] [employer]. Sent in vitamin B6 + [employer].

## 2025-03-31 ENCOUNTER — MYC MEDICAL ADVICE (OUTPATIENT)
Dept: OBGYN | Facility: CLINIC | Age: 35
End: 2025-03-31

## 2025-03-31 ENCOUNTER — VIRTUAL VISIT (OUTPATIENT)
Dept: OBGYN | Facility: CLINIC | Age: 35
End: 2025-03-31
Attending: ADVANCED PRACTICE MIDWIFE
Payer: COMMERCIAL

## 2025-03-31 ENCOUNTER — TELEPHONE (OUTPATIENT)
Dept: OBGYN | Facility: CLINIC | Age: 35
End: 2025-03-31

## 2025-03-31 DIAGNOSIS — R11.0 NAUSEA: Primary | ICD-10-CM

## 2025-03-31 DIAGNOSIS — Z32.01 PREGNANCY TEST POSITIVE: Primary | ICD-10-CM

## 2025-03-31 RX ORDER — ONDANSETRON 4 MG/1
4 TABLET, ORALLY DISINTEGRATING ORAL EVERY 8 HOURS PRN
Qty: 30 TABLET | Refills: 1 | Status: SHIPPED | OUTPATIENT
Start: 2025-03-31 | End: 2025-04-01

## 2025-03-31 NOTE — PATIENT INSTRUCTIONS
Thank you for trusting us with your care!   Please be aware, if you are on Mychart, you may see your results prior to your providers review. If labs are abnormal, we will call or message you on Mychart with a follow up plan.    If you need to contact us for questions about:  Symptoms, Scheduling & Medical Questions; Non-urgent (2-3 day response) Mychart message, Urgent (needing response today) [phone] (if after 3:30pm next day response)   Prescriptions: Please call your Pharmacy   Billing: [facility] [phone] or [facility]:[phone]    Learning About Pregnancy  Your Care Instructions     Your health in the early weeks of your pregnancy is particularly important for your baby's health. Take good care of yourself. Anything you do that harms your body can also harm your baby.  Make sure to go to all of your doctor appointments. Regular checkups will help keep you and your baby healthy.  How can you care for yourself at home?  Diet    Choose healthy foods like fruits, vegetables, whole grains, lean proteins, and healthy fats.     Choose foods that are good sources of calcium, iron, and folate. You can try dairy products, dark leafy greens, fortified orange juice and cereals, almonds, broccoli, dried fruit, and beans.     Do not skip meals or go for many hours without eating. If you are nauseated, try to eat a small, healthy snack every 2 to 3 hours.     Avoid fish that are high in mercury. These include shark, swordfish, mayo mackerel, marlin, orange roughy, and bigeye tuna, as well as tilefish from the [facility].     It's okay to eat up to 8 to 12 ounces a week of fish that are low in mercury or up to 4 ounces a week of fish that have medium levels of mercury. Some fish that are low in mercury are salmon, shrimp, canned light tuna, cod, and tilapia. Some fish that have medium levels of mercury are halibut and white albacore tuna.     Drink plenty of fluids. If you have kidney, heart, or liver disease  and have to limit fluids, talk with your doctor before you increase the amount of fluids you drink.     Limit caffeine to about 200 to 300 mg per day. On average, a cup of brewed coffee has around 80 to 100 mg of caffeine.     Do not drink alcohol, such as beer, wine, or hard liquor.     Take a multivitamin that contains at least 400 micrograms (mcg) of folic acid to help prevent birth defects. Fortified cereal and whole wheat bread are good additional sources of folic acid.     Increase the calcium in your diet. Try to drink a quart of skim milk each day. You may also take calcium supplements and choose foods such as cheese and yogurt.   Lifestyle    Make sure you go to your follow-up appointments.     Get plenty of rest. You may be unusually tired while you are pregnant.     Get at least 30 minutes of exercise on most days of the week. Walking is a good choice. If you have not exercised in the past, start out slowly. Take several short walks each day.     Do not smoke. If you need help quitting, talk to your doctor about stop-smoking programs. These can increase your chances of quitting for good.     Do not touch cat feces or litter boxes. Also, wash your hands after you handle raw meat, and fully cook all meat before you eat it. Wear gloves when you work in the yard or garden, and wash your hands well when you are done. Cat feces, raw or undercooked meat, and contaminated dirt can cause an infection that may harm your baby or lead to a miscarriage.     Avoid things that can make your body too hot and may be harmful to your baby, such as a hot tub or sauna. Or talk with your doctor before doing anything that raises your body temperature. Your doctor can tell you if it's safe.     Avoid chemical fumes, paint fumes, or poisons.     Do not use illegal drugs, marijuana, or alcohol.   Medicines    Review all of your medicines with your doctor. Some of your routine medicines may need to be changed to protect your baby.      Use acetaminophen (Tylenol) to relieve minor problems, such as a mild headache or backache or a mild fever with cold symptoms. Do not use nonsteroidal anti-inflammatory drugs (NSAIDs), such as ibuprofen (Advil, Motrin) or naproxen (Aleve), unless your doctor says it is okay.     Do not take two or more pain medicines at the same time unless the doctor told you to. Many pain medicines have acetaminophen, which is Tylenol. Too much acetaminophen (Tylenol) can be harmful.     Take your medicines exactly as prescribed. Call your doctor if you think you are having a problem with your medicine.   To manage morning sickness    Keep food in your stomach, but not too much at once. Try eating five or six small meals a day instead of three large meals.     For nausea when you wake up, eat a small snack, such as a couple of crackers or pretzels, before rising. Allow a few minutes for your stomach to settle before you slowly get up.     Try to avoid smells and foods that make you feel nauseated. High-fat or greasy foods, milk, and coffee may make nausea worse. Some foods that may be easier to tolerate include cold, spicy, sour, and salty foods.     Drink enough fluids. Water and other caffeine-free drinks are good choices.     Take your prenatal vitamins at night on a full stomach.     Try foods and drinks made with kirsty. Kirsty may help with nausea.     Get lots of rest. Morning sickness may be worse when you are tired.     Talk to your doctor about over-the-counter products, such as vitamin B6 or doxylamine, to help relieve symptoms.     Try a P6 acupressure wrist band. These anti-nausea wristbands help some people.   Follow-up care is a key part of your treatment and safety. Be sure to make and go to all appointments, and call your doctor if you are having problems. It's also a good idea to know your test results and keep a list of the medicines you take.  Where can you learn more?  Go to  "https://www.Intellikine.net/patiented  Enter E868 in the search box to learn more about \"Learning About Pregnancy.\"  Current as of: April 30, 2024  Content Version: 14.4 2024-2025 PayMins.   Care instructions adapted under license by your healthcare professional. If you have questions about a medical condition or this instruction, always ask your healthcare professional. PayMins disclaims any warranty or liability for your use of this information.    Weeks 6 to 10 of Your Pregnancy: Care Instructions  During these weeks of pregnancy, your body goes through many changes. You may start to feel different, both in your body and your emotions. Each pregnancy is different, so there's no \"right\" way to feel. These early weeks are a time to make healthy choices for you and your pregnancy.    Take a daily prenatal vitamin. Choose one with folic acid in it.   Avoid alcohol, tobacco, and drugs (including marijuana). If you need help quitting, talk to your doctor.     Drink plenty of liquids.  Be sure to drink enough water. And limit sodas, other sweetened drinks, and caffeine.     Choose foods that are good sources of calcium, iron, and folate.  You can try dairy products, dark leafy greens, fortified orange juice and cereals, almonds, broccoli, dried fruit, and beans.     Avoid foods that may be harmful.  Don't eat raw meat, deli meat, raw seafood, or raw eggs. Avoid soft cheese and unpasteurized dairy, like Brie and blue cheese. And don't eat fish that contains a lot of mercury, like shark and swordfish.     Don't touch yancy litter or cat poop.  They can cause an infection that could be harmful during pregnancy.     Avoid things that can make your body too hot.  For example, avoid hot tubs and saunas.     Soothe morning sickness.  Try eating 5 or 6 small meals a day, getting some fresh air, or using rocio to control symptoms.     Ask your doctor about flu and COVID-19 shots.  Getting them " "can help protect against infection.   Follow-up care is a key part of your treatment and safety. Be sure to make and go to all appointments, and call your doctor if you are having problems. It's also a good idea to know your test results and keep a list of the medicines you take.  Where can you learn more?  Go to https://www.[email or web address].net/patiented  Enter G112 in the search box to learn more about \"Weeks 6 to 10 of Your Pregnancy: Care Instructions.\"  Current as of: April 30, 2024  Content Version: 14.4 2024-2025 iMER.   Care instructions adapted under license by your healthcare professional. If you have questions about a medical condition or this instruction, always ask your healthcare professional. iMER disclaims any warranty or liability for your use of this information.       Pregnancy: Managing Morning Sickness (01:48)  Your health professional recommends that you watch this short online health video.  Learn how to manage morning sickness during pregnancy.   Purpose: Learn how to manage morning sickness during pregnancy.  Goal: Learn how to manage morning sickness during pregnancy.    Watch: Scan the QR code or visit the link to view video       https://hwi.se/[email or web address]  Current as of: April 30, 2024  Content Version: 14.4 2006-2025 iMER.   Care instructions adapted under license by your healthcare professional. If you have questions about a medical condition or this instruction, always ask your healthcare professional. iMER disclaims any warranty or liability for your use of this information.    Pregnancy and Heartburn: Care Instructions  Overview     Heartburn is a common problem during pregnancy.  Heartburn happens when stomach acid backs up into the tube that carries food to the stomach. This tube is called the esophagus. Early in pregnancy, heartburn is caused by hormone changes that slow down digestion. Later on, it's " also caused by the large uterus pushing up on the stomach.  Even though you can't fix the cause, there are things you can do to get relief. Treating heartburn during pregnancy focuses first on making lifestyle changes, like changing what and how you eat, and on taking medicines.  Heartburn usually improves or goes away after childbirth.  Follow-up care is a key part of your treatment and safety. Be sure to make and go to all appointments, and call your doctor if you are having problems. It's also a good idea to know your test results and keep a list of the medicines you take.  How can you care for yourself at home?  Eat small, frequent meals.  Avoid foods that make your symptoms worse, such as oranges, chocolate, peppermint, and spicy foods. Avoid drinks with caffeine, such as coffee, tea, and sodas.  Avoid bending over or lying down after meals.  Take a short walk after you eat.  If heartburn is a problem at night, do not eat for 2 hours before bedtime.  Take antacids like Mylanta, Maalox, Rolaids, or Tums. Do not take antacids that have sodium bicarbonate, magnesium trisilicate, or aspirin. Be careful when you take over-the-counter antacid medicines. Many of these medicines have aspirin in them. While you are pregnant, do not take aspirin or medicines that contain aspirin unless your doctor says it is okay.  If you're not getting relief, talk to your doctor. You may be able to take a stronger acid-reducing medicine.    When should you call for help?   Call your doctor now or seek immediate medical care if:    You have new or worse belly pain.     You are vomiting.   Watch closely for changes in your health, and be sure to contact your doctor if:    You have new or worse symptoms of reflux.     You are losing weight.     You have trouble or pain swallowing.     You do not get better as expected.   Where can you learn more?  Go to https://www.healthwise.net/patiented  Enter U946 in the search box to learn more about  "\"Pregnancy and Heartburn: Care Instructions.\"  Current as of: April 30, 2024  Content Version: 14.4    6198-8337 Encore Alert.   Care instructions adapted under license by your healthcare professional. If you have questions about a medical condition or this instruction, always ask your healthcare professional. Encore Alert disclaims any warranty or liability for your use of this information.    Constipation: Care Instructions  Overview     Constipation means that you have a hard time passing stools (bowel movements). People pass stools from 3 times a day to once every 3 days. What is normal for you may be different. Constipation may occur with pain in the rectum and cramping. The pain may get worse when you try to pass stools. Sometimes there are small amounts of bright red blood on toilet paper or the surface of stools. This is because of enlarged veins near the rectum (hemorrhoids).  A few changes in your diet and lifestyle may help you avoid ongoing constipation. Your doctor may also prescribe medicine to help loosen your stool.  Some medicines can cause constipation. These include pain medicines and antidepressants. Tell your doctor about all the medicines you take. Your doctor may want to make a medicine change to ease your symptoms.  Follow-up care is a key part of your treatment and safety. Be sure to make and go to all appointments, and call your doctor if you are having problems. It's also a good idea to know your test results and keep a list of the medicines you take.  How can you care for yourself at home?  Drink plenty of fluids. If you have kidney, heart, or liver disease and have to limit fluids, talk with your doctor before you increase the amount of fluids you drink.  Include high-fiber foods in your diet each day. These include fruits, vegetables, beans, and whole grains.  Get at least 30 minutes of exercise on most days of the week. Walking is a good choice. You also may want to " "do other activities, such as running, swimming, cycling, or playing tennis or team sports.  Take a fiber supplement, such as Citrucel or Metamucil, every day. Read and follow all instructions on the label.  Schedule time each day for a bowel movement. A daily routine may help. Take your time having a bowel movement, but don't sit for more than 10 minutes at a time. And don't strain too much.  Support your feet with a small step stool when you sit on the toilet. This helps flex your hips and places your pelvis in a squatting position.  Your doctor may recommend an over-the-counter laxative to relieve your constipation. Examples are Milk of Magnesia and MiraLax. Read and follow all instructions on the label. Do not use laxatives on a long-term basis.  When should you call for help?   Call your doctor now or seek immediate medical care if:    You have new or worse belly pain.     You have new or worse nausea or vomiting.     You have blood in your stools.   Watch closely for changes in your health, and be sure to contact your doctor if:    Your constipation is getting worse.     You do not get better as expected.   Where can you learn more?  Go to https://www.ContaAzul.net/patiented  Enter P343 in the search box to learn more about \"Constipation: Care Instructions.\"  Current as of: October 19, 2024  Content Version: 14.4    8404-0059 Buxfer.   Care instructions adapted under license by your healthcare professional. If you have questions about a medical condition or this instruction, always ask your healthcare professional. Buxfer disclaims any warranty or liability for your use of this information.    Learning About High-Iron Foods  What foods are high in iron?     The foods you eat contain nutrients, such as vitamins and minerals. Iron is a nutrient. Your body needs the right amount to stay healthy and work as it should. You can use the list below to help you make choices about which " "foods to eat.  Here are some foods that contain iron. They have 1 to 2 milligrams of iron per serving.  Fruits  Figs (dried), 5 figs  Vegetables  Asparagus (canned), 6 bo  Gabriella, beet, Swiss chard, or turnip greens, 1 cup  Dried peas, cooked,   cup  Seaweed, spirulina (dried),   cup  Spinach, (cooked)   cup or (raw) 1 cup  Grains  Cereals, fortified with iron, 1 cup  Grits (instant, cooked), fortified with iron,   cup  Meats and other protein foods  Beans (kidney, lima, navy, white), canned or cooked,   cup  Beef or lamb, 3 oz  Chicken giblets, 3 oz  Chickpeas (garbanzo beans),   cup  Liver of beef, lamb, or pork, 3 oz  Oysters (cooked), 3 oz  Sardines (canned), 3 oz  Soybeans (boiled),   cup  Tofu (firm),   cup  Work with your doctor to find out how much of this nutrient you need. Depending on your health, you may need more or less of it in your diet.  Where can you learn more?  Go to https://www.PrimeSense.net/patiented  Enter R005 in the search box to learn more about \"Learning About High-Iron Foods.\"  Current as of: October 7, 2024  Content Version: 14.4    6903-6079 DigitalMR.   Care instructions adapted under license by your healthcare professional. If you have questions about a medical condition or this instruction, always ask your healthcare professional. DigitalMR disclaims any warranty or liability for your use of this information.    Rh Antibodies Screening During Pregnancy: About This Test  What is it?     The Rh antibodies screening test is a blood test. It checks your blood for Rh antibodies. If you have Rh-negative blood and have been exposed to Rh-positive blood, your immune system may make antibodies to attack the Rh-positive blood. When a pregnant woman has these antibodies, it is called Rh sensitization.  Why is this test done?  The Rh antibodies screening test is done during pregnancy to find out if your baby is at risk for Rh disease. This can happen if you have " "Rh-negative blood and your baby has Rh-positive blood. If your Rh-negative blood mixes with Rh-positive blood, your immune system will make antibodies to attack the Rh-positive blood.  During pregnancy, these antibodies could attach to the baby's red blood cells. This can cause your baby to have serious health problems. The results of this test will help your doctor know how to best care for you and your baby during your pregnancy.  How do you prepare for the test?  In general, there's nothing you have to do before this test, unless your doctor tells you to.  How is the test done?  A health professional uses a needle to take a blood sample, usually from the arm.  What happens after the test?  You will probably be able to go home right away. It depends on the reason for the test.  You can go back to your usual activities right away.  Follow-up care is a key part of your treatment and safety. Be sure to make and go to all appointments, and call your doctor if you are having problems. It's also a good idea to keep a list of the medicines you take. Ask your doctor when you can expect to have your test results.  Where can you learn more?  Go to https://www.The Editorialist.net/patiented  Enter P722 in the search box to learn more about \"Rh Antibodies Screening During Pregnancy: About This Test.\"  Current as of: April 30, 2024  Content Version: 14.4    1033-9781 Webrazzi.   Care instructions adapted under license by your healthcare professional. If you have questions about a medical condition or this instruction, always ask your healthcare professional. Webrazzi disclaims any warranty or liability for your use of this information.    Learning About Preventing Rh Disease  What is Rh disease?     Rh disease can be a serious problem in pregnancy. It happens when substances called antibodies in the mother's blood cause red blood cells in her baby's blood to be destroyed. This can occur when the blood " types of a mother and her baby do not match.  All blood has an Rh factor. This is what makes a blood type positive or negative. When you are Rh-negative, your baby may be Rh-negative or Rh-positive. If your baby has Rh-positive blood and it mixes with yours, your body will make antibodies. This is called Rh sensitization.  Most of the time, this is not a problem in a first pregnancy. But in future pregnancies, it could cause Rh disease.  A  with Rh disease has mild anemia and may have jaundice. In severe cases, anemia, jaundice, and swelling can be very dangerous or fatal. Some babies need to be delivered early. Some need special care in the NICU. A very sick baby will need a blood transfusion before or after birth.  Fortunately, Rh sensitization is usually easy to prevent.  That's why it's important to get your Rh status checked in your first trimester. It doesn't cause any warning signs. A blood test is the only way to know if you are Rh-sensitive or are at risk for it.  How can you prevent Rh disease?  If you are Rh-negative, your doctor gives you an Rh immune globulin shot (such as RhoGAM). It helps prevent your body from making the antibodies that attack your baby's red blood cells.  Timing is important. You need the shot at certain times during your pregnancy. And you need one anytime there is a chance that your baby's blood might mix with yours. That can happen with certain prenatal tests or when you have pregnancy bleeding, such as:  Right after any pregnancy loss, amniocentesis, or CVS testing.  After turning of a breech baby.  Before and maybe after childbirth. Your doctor gives you a shot around week 28. If your  is Rh-positive, you will have another shot.  Follow-up care is a key part of your treatment and safety. Be sure to make and go to all appointments, and call your doctor if you are having problems. It's also a good idea to know your test results and keep a list of the medicines you  "take.  Where can you learn more?  Go to https://www.ViewsIQ.net/patiented  Enter W177 in the search box to learn more about \"Learning About Preventing Rh Disease.\"  Current as of: April 30, 2024  Content Version: 14.4    2793-4732 Viableware.   Care instructions adapted under license by your healthcare professional. If you have questions about a medical condition or this instruction, always ask your healthcare professional. Viableware disclaims any warranty or liability for your use of this information.    Learning About Rh Immunoglobulin Shots  Introduction     An Rh immunoglobulin shot is given to pregnant women who have Rh-negative blood.  You may have Rh-negative blood, and your baby may have Rh-positive blood. If the two types of blood mix, your body will make antibodies. This is called Rh sensitization. Most of the time, this is not a problem the first time you're pregnant. But it could cause problems in future pregnancies.  This shot keeps your body from making the antibodies. You get the shot around 28 weeks of pregnancy. After the birth, your baby's blood is tested. If the blood is Rh positive, you will get another shot. You may also get the shot if you have vaginal bleeding while you are pregnant or if you have a miscarriage. These shots protect future pregnancies.  Women with Rh negative blood will need this shot each time they get pregnant.  Example  Rh immunoglobulin (HypRho-D, MICRhoGAM, and RhoGAM)  Possible side effects  Rare side effects may include:  Some mild pain where you got the shot.  A slight fever.  An allergic reaction.  You may have other side effects not listed here. Check the information that comes with your medicine.  What to know about taking this medicine  You may need more than one shot. You may need the shot again:  After amniocentesis, fetal blood sampling, or chorionic villus sampling tests.  If you have bleeding in your second or third trimester.  After " "turning of a breech baby.  After an injury to the belly while you are pregnant.  After a miscarriage or an .  Before or right after treatment for an ectopic or a partial molar pregnancy.  Tell your doctor if you have any allergies or have had a bad response to medicines in the past.  If you get this shot within 3 months of getting a live-virus vaccine, the vaccine may not work. Your doctor will tell you if you need more vaccine.  Check with your doctor or pharmacist before you use any other medicines. This includes over-the-counter medicines. Make sure your doctor knows all of the medicines, vitamins, herbs, and supplements you take. Taking some medicines at the same time can cause problems.  Where can you learn more?  Go to https://www.Booker.Alibaba/patiented  Enter V615 in the search box to learn more about \"Learning About Rh Immunoglobulin Shots.\"  Current as of: 2024  Content Version: 14.4    4624-5035 [employer].   Care instructions adapted under license by your healthcare professional. If you have questions about a medical condition or this instruction, always ask your healthcare professional. [employer] disclaims any warranty or liability for your use of this information.    Rubella (Citizen of [place] Measles): Care Instructions  Overview  Rubella, also called Citizen of [place] measles or 3-day measles, is a disease caused by a virus. It spreads by coughs, sneezes, and close contact. Rubella usually is mild and does not cause long-term problems. But if you are pregnant and get it, you can give the disease to your unborn baby. This can cause serious birth defects.  While you have rubella, you may get a rash and a mild fever, and the lymph glands in your neck may swell. Older children often have a fever, eye pain, a sore throat, and body aches. You can relieve most symptoms with care at home. Avoid being around others, especially pregnant people, until your rash has been gone for at least 4 " days. People who have not had this disease before or have not had the vaccine have the greatest chance of getting the virus.  Follow-up care is a key part of your treatment and safety. Be sure to make and go to all appointments, and call your doctor if you are having problems. It's also a good idea to know your test results and keep a list of the medicines you take.  How can you care for yourself at home?  Drink plenty of fluids. If you have kidney, heart, or liver disease and have to limit fluids, talk with your doctor before you increase the amount of fluids you drink.  Get plenty of rest to help your body heal.  Take an over-the-counter pain medicine, such as acetaminophen (Tylenol), ibuprofen (Advil, Motrin), or naproxen (Aleve), to reduce fever and discomfort. Read and follow all instructions on the label. Do not give aspirin to anyone younger than 20. It has been linked to Reye syndrome, a serious illness.  Do not take two or more pain medicines at the same time unless the doctor told you to. Many pain medicines have acetaminophen, which is Tylenol. Too much acetaminophen (Tylenol) can be harmful.  Try not to scratch the rash. Put cold, wet cloths on the rash to reduce itching.  Do not smoke. Smoking can make your symptoms worse. If you need help quitting, talk to your doctor about stop-smoking programs and medicines. These can increase your chances of quitting for good.  Avoid contact with people who have never had rubella and who have not been immunized.  When should you call for help?   Call your doctor now or seek immediate medical care if:    You have a fever with a stiff neck or a severe headache.     You are sensitive to light or feel very sleepy or confused.   Watch closely for changes in your health, and be sure to contact your doctor if:    You do not get better as expected.   Where can you learn more?  Go to https://www.healthwise.net/patiented  Enter B812 in the search box to learn more about  "\"Rubella ([place] Measles): Care Instructions.\"  Current as of: 2024  Content Version: 14.4    7852-0031 [employer].   Care instructions adapted under license by your healthcare professional. If you have questions about a medical condition or this instruction, always ask your healthcare professional. [employer] disclaims any warranty or liability for your use of this information.    Gonorrhea and Chlamydia: About These Tests  What is it?  These tests use a sample of urine or other body fluid to look for the bacteria that cause these sexually transmitted infections (STIs). The fluid sample can come from the cervix, vagina, rectum, throat, or eyes.  Why is this test done?  These tests may be done to:  Find out if symptoms are caused by gonorrhea or chlamydia.  Check people who are at high risk of being infected with gonorrhea or chlamydia.  Retest people several months after they have been treated for gonorrhea or chlamydia.  Check for infection in your  if you had a gonorrhea or chlamydia infection at the time of delivery.  How can you prepare for the test?  If you are going to have a urine test, do not urinate for at least 1 hour before the test.  If you think you may have chlamydia or gonorrhea, don't have sexual intercourse until you get your test results. And you may want to have tests for other STIs, such as HIV.  How is the test done?  For a direct sample, a swab is used to collect body fluid from the cervix, vagina, rectum, throat, or eyes. Your doctor may collect the sample. Or you may be given instructions on how to collect your own sample.  For a urine sample, you will collect the urine that comes out when you first start to urinate. Don't wipe the genital area clean before you urinate.  How long does the test take?  The test will take a few minutes.  What happens after the test?  You will be able to go home right away.  You can go back to your usual activities right " "away.  If you do have an infection, don't have sexual intercourse for 7 days after you start treatment. And your sex partner(s) should also be treated.  Follow-up care is a key part of your treatment and safety. Be sure to make and go to all appointments, and call your doctor if you are having problems. It's also a good idea to keep a list of the medicines you take. Ask your doctor when you can expect to have your test results.  Where can you learn more?  Go to https://www.DineroTaxi.net/patiented  Enter K976 in the search box to learn more about \"Gonorrhea and Chlamydia: About These Tests.\"  Current as of: April 30, 2024  Content Version: 14.4    6159-6891 [employer].   Care instructions adapted under license by your healthcare professional. If you have questions about a medical condition or this instruction, always ask your healthcare professional. [employer] disclaims any warranty or liability for your use of this information.    Trichomoniasis: About This Test  What is it?     This test uses a sample of urine or other body fluid to look for the tiny parasite that causes trichomoniasis (also called trich). The fluid sample can come from the vagina, cervix, or urethra. Your doctor may choose to use one or more of many available tests.  Why is it done?  A trich test may be done to:  Find out if symptoms are caused by trich.  Check people who are at high risk for being infected with trich.  Check after treatment to make sure that the infection is gone.  How do you prepare for the test?  If you are going to have a urine test, do not urinate for at least 1 hour before the test.  How is the test done?  For a direct sample, a swab is used to collect body fluid from the cervix, vagina, or urethra. Your doctor may collect the sample. Or you may be given instructions on how to collect your own sample.  For a urine sample, you will collect the urine that comes out when you first start to urinate. " Don't wipe the area clean before you urinate.  How long does the test take?  It will take a few minutes to collect a sample.  What happens after the test?  You can go home right away.  You can go back to your usual activities right away.  You may get the test results the same day or several days later. It depends on the test used.  If you do have an infection, don't have sexual intercourse for 7 days after you start treatment. Your sex partner or partners should also be treated.  Follow-up care is a key part of your treatment and safety. Be sure to make and go to all appointments, and call your doctor if you are having problems. Ask your doctor when you can expect to have your test results.  Current as of: April 30, 2024  Content Version: 14.4    7333-9825 [facility].   Care instructions adapted under license by your healthcare professional. If you have questions about a medical condition or this instruction, always ask your healthcare professional. [facility] disclaims any warranty or liability for your use of this information.    HIV Testing: Care Instructions  Overview  You can get tested for the human immunodeficiency virus (HIV). Most doctors use a blood test to check for HIV antibodies and antigens in your blood. It may also check for the genetic material (RNA) of HIV. Some tests use saliva to check for HIV antibodies. But these aren't as accurate. For example, they may give a false result if you've just been infected.  What do the results mean?        Normal (negative)   No HIV antibodies, antigens, or RNA were found.  You may need more testing. It can make sure your test results are correct.        Uncertain (indeterminate)   Test results didn't clearly show if you have an HIV infection.  HIV antibodies or antigens may not have formed yet.  Some other type of antibody or antigen may have affected the results.  You will need another test to be sure.        Abnormal (positive)   HIV  "antibodies, antigens, or RNA were found.  If you haven't had an RNA test yet, one will be done. If it's positive, you have HIV.  If your test result is positive, your doctor will talk to you. You will discuss starting treatment.  Follow-up care is a key part of your treatment and safety. Be sure to make and go to all appointments, and call your doctor if you are having problems. It's also a good idea to know your test results and keep a list of the medicines you take.  Where can you learn more?  Go to https://www.Shob[employer]ies.net/patiented  Enter T792 in the search box to learn more about \"HIV Testing: Care Instructions.\"  Current as of: April 30, 2024  Content Version: 14.4    5502-9418 [employer].   Care instructions adapted under license by your healthcare professional. If you have questions about a medical condition or this instruction, always ask your healthcare professional. [employer] disclaims any warranty or liability for your use of this information.    Hepatitis C Virus Tests: About These Tests  What are they?     Hepatitis C virus tests are blood tests that check for substances in the blood that show whether you have hepatitis C now or had it in the past. The tests can also tell you what type of hepatitis C you have and how severe the disease is. This can help your doctor with treatment.  If the tests show that you have long-term hepatitis C, you need to take steps to prevent spreading the disease.  Why are these tests done?  You may need these tests if:  You have symptoms of hepatitis.  You may have been exposed to the virus. You are more likely to have been exposed to the virus if you inject drugs or are exposed to body fluids (such as if you are a health care worker).  You've had other tests that show you have liver problems.  You are 18 to 79 years old.  You have an HIV infection.  The tests also are done to help your doctor decide about your treatment and see how well it " "works.  How do you prepare for the test?  In general, there's nothing you have to do before this test, unless your doctor tells you to.  How is the test done?  A health professional uses a needle to take a blood sample, usually from the arm.  What happens after these tests?  You will probably be able to go home right away.  You can go back to your usual activities right away.  Follow-up care is a key part of your treatment and safety. Be sure to make and go to all appointments, and call your doctor if you are having problems. It's also a good idea to keep a list of the medicines you take. Ask your doctor when you can expect to have your test results.  Where can you learn more?  Go to https://www.Cinedigm.BioWizard/patiented  Enter W551 in the search box to learn more about \"Hepatitis C Virus Tests: About These Tests.\"  Current as of: April 30, 2024  Content Version: 14.4    9931-4349 [employer].   Care instructions adapted under license by your healthcare professional. If you have questions about a medical condition or this instruction, always ask your healthcare professional. [employer] disclaims any warranty or liability for your use of this information.    Learning About Fetal Ultrasound Results  What is a fetal ultrasound?     Fetal ultrasound is a test that lets your doctor see an image of your baby. Your doctor learns information about your baby from this picture. You may find out, for example, if you are having a boy or a girl. But the main reason you have this test is to get information about your baby's health.  (You may hear your baby called a fetus. This is a common medical term for a baby that's growing in the mother's uterus.)  What kind of information can you learn from this test?  The findings of an ultrasound fall into two categories, normal and abnormal.  Normal  The fetus is the right size for its age.  The placenta is the expected size and does not cover the cervix.  There is " enough amniotic fluid in the uterus.  No birth defects can be seen.  Abnormal  The fetus is small or large for its age.  The placenta covers the cervix.  There is too much or too little amniotic fluid in the uterus.  The fetus may have a birth defect.  What does an abnormal result mean?  Abnormal seems to imply that something is wrong with your baby. But what it means is that the test has shown something the doctor wants to take a closer look at.  And that's what happens next. Your doctor will talk to you about what further test or tests you may need.  What do the results mean?  Some of the things your doctor may see on an abnormal ultrasound include:  Echogenic bowel.  The bowel looks very bright on the screen. This could mean that there's blood in the bowel. Or it could mean that something is blocking the small bowel.  Increased nuchal translucency.  The ultrasound measures the thickness at the back of the baby's neck. An increase in thickness is sometimes an early sign of Down syndrome.  Increased or decreased amniotic fluid.  The doctor will look for a reason for the level of amniotic fluid and will watch the pregnancy closely as it progresses.  Large ventricles.  Ventricles in the brain look larger than they should. Your doctor may take a closer look at the brain.  Renal pyelectasis/hydronephrosis.  The ultrasound measures the fluid around the kidney. If there is more fluid than expected, there is a chance of urinary tract or kidney problems.  Short long bones.  The ultrasound measures certain arm and leg bones. A long bone (humerus or femur) that is shorter than average could be a sign of Down syndrome.  Subchorionic hemorrhage.  An ultrasound can show bleeding under one of the membranes that surrounds the fetus. Some women don't have symptoms of bleeding. The ultrasound can find this problem when women are not bleeding from their vagina. Women who have this condition have a slightly higher chance of  "miscarriage.  What do you do now?  Take a deep breath, and let it out. Keep in mind that an abnormal finding on an ultrasound, after it's coupled with more information, may:  Turn out to be nothing.  Turn out to be something mild that won't affect the baby.  Turn out to be something more serious. But if this happens, early diagnosis helps you and your doctor plan treatment options sooner rather than later.  Your medical team is there for you. So are your family and friends. Ask questions, and get the help and support you need.  Follow-up care is a key part of your treatment and safety. Be sure to make and go to all appointments, and call your doctor if you are having problems. It's also a good idea to know your test results and keep a list of the medicines you take.  Where can you learn more?  Go to https://www.[employer].net/patiented  Enter K451 in the search box to learn more about \"Learning About Fetal Ultrasound Results.\"  Current as of: April 30, 2024  Content Version: 14.4    7077-9390 [employer].   Care instructions adapted under license by your healthcare professional. If you have questions about a medical condition or this instruction, always ask your healthcare professional. [employer] disclaims any warranty or liability for your use of this information.    Learning About Prenatal Visits  Overview     Regular prenatal visits are very important during any pregnancy. These quick office visits may seem simple and routine. But they can help you have a safe and healthy pregnancy. Your doctor is watching for problems that can only be found through regular checkups. The visits also give you and your doctor time to build a good relationship.  After your first visit, you will most likely start on a schedule of monthly visits. In your third trimester, the visits will get more frequent. Based on your health, your age, and if you've had a normal, full-term pregnancy before, your doctor may " want to see you more or less often.  At different times in your pregnancy, you will have exams and tests. Some are routine. Others are done only when there is a chance of a problem. Everything healthy you do for your body helps you have a healthy pregnancy. Rest when you need it. Eat well, drink plenty of water, and exercise regularly.  What happens during a prenatal visit?  You will have blood pressure checks, along with urine tests. You also may have blood tests. If you need to go to the bathroom while waiting for the doctor, tell the nurse. You will be given a sample cup so your urine can be tested.  You will be weighed and have your belly measured.  Your doctor may listen to the fetal heartbeat with a special device.  At about 24 weeks, and possibly earlier in your pregnancy, your doctor will check your blood sugar (glucose tolerance test) for diabetes that can occur during pregnancy. This is gestational diabetes, which can be harmful.  You will have tests to check for infections that could harm your . These include group B streptococcus and hepatitis B.  Your doctor may do ultrasounds to check for problems. This also checks the position of the fetus. An ultrasound uses sound waves to produce a picture of the fetus.  You may get your vaccines updated.  Your doctor may ask you questions to check for signs of anxiety or depression. Tell your doctor if you feel sad, anxious, or hopeless for more than a few days.  You may have other tests at any time during your pregnancy.  Use your visits to discuss with your doctor any concerns you have.  How can you care for yourself at home?  Get plenty of rest.  Try to exercise every day, if your doctor says it is okay. If you have not exercised in the past, start out slowly. For example, you can take short walks each day.  Choose healthy foods, such as fruits, vegetables, whole grains, lean proteins, low-fat dairy, and healthy fats.  Drink plenty of fluids. Cut down on  "drinks with caffeine, such as coffee, tea, and cola. If you have kidney, heart, or liver disease and have to limit fluids, talk with your doctor before you increase the amount of fluids you drink.  Try to avoid chemical fumes, paint fumes, and poisons.  If you smoke, vape, or use alcohol, marijuana, or other drugs, quit or cut back as much as you can. Talk to your doctor if you need help quitting.  Review all of your medicines, including over-the-counter medicines and supplements, with your doctor. Some of your routine medicines may need to be changed. Do not stop or start taking any medicines without talking to your doctor first.  Follow-up care is a key part of your treatment and safety. Be sure to make and go to all appointments, and call your doctor if you are having problems. It's also a good idea to know your test results and keep a list of the medicines you take.  Where can you learn more?  Go to https://www.[employer].net/patiented  Enter J502 in the search box to learn more about \"Learning About Prenatal Visits.\"  Current as of: April 30, 2024  Content Version: 14.4    6122-4623 [employer].   Care instructions adapted under license by your healthcare professional. If you have questions about a medical condition or this instruction, always ask your healthcare professional. [employer] disclaims any warranty or liability for your use of this information.    Intimate Partner Violence: Care Instructions  Overview     If you want to save this information but don't think it is safe to take it home, see if a trusted friend can keep it for you. Plan ahead. Know who you can call for help, and memorize the phone number.   Be careful online too. Your online activity may be seen by others. Do not use your personal computer or device to read about this topic. Use a safe computer, such as one at work, a friend's home, or a library.    Intimate partner violence--a type of domestic abuse--is " different from an argument now and then. It is a pattern of abuse that one person may use to control another person's behavior. It may start with threats and name-calling. Then, it may lead to more serious acts, like pushing and slapping. The abuse also may occur in other areas. For example, the abuser may withhold money or spend a partner's money without their knowledge.  Abuse can cause serious harm. You are more likely to have a long-term health problem from the injuries and stress of living in a violent relationship. People who are sexually abused by their partners have more sexually transmitted infections and unplanned pregnancies. Anyone who is abused also faces emotional pain. Anyone can be abused in relationships. In some relationships, both people use abusive behavior.  If you are pregnant, abuse can cause problems such as poor weight gain, infections, and bleeding. Abuse during this time may increase your baby's risk of low birth weight, premature birth, and death.  Follow-up care is a key part of your treatment and safety. Be sure to make and go to all appointments, and call your doctor if you are having problems. It's also a good idea to know your test results and keep a list of the medicines you take.  How can you care for yourself at home?  If you do not have a safe place to stay, discuss this with your doctor before you leave.  Have a plan for where to go, how to leave your home, and where to stay in case of an emergency. Do not tell your partner about your plan. Contact:  The National Domestic Violence Hotline toll-free at 1-150.772.4871. They can help you find resources in your area.  Your local police department, hospital, or clinic for information about shelters and safe homes near you.  Talk to a trusted friend or neighbor, a counselor, or a mauricio leader. Do not feel that you have to hide what happened.  Teach your children how to call for help in an emergency.  Be alert to warning signs, such as  "threats, heavy alcohol use, or drug use. This can help you avoid danger.  If you can, make sure that there are no guns or other weapons in your home.  When should you call for help?   Call 911 anytime you think you may need emergency care. For example, call if:    You or someone else has just been abused.     You think you or someone else is in danger of being abused.   Watch closely for changes in your health, and be sure to contact your doctor if you have any problems.  Where can you learn more?  Go to https://www.PriceAdvice.net/patiented  Enter G282 in the search box to learn more about \"Intimate Partner Violence: Care Instructions.\"  Current as of: July 31, 2024  Content Version: 14.4    0951-6164 [employer].   Care instructions adapted under license by your healthcare professional. If you have questions about a medical condition or this instruction, always ask your healthcare professional. [employer] disclaims any warranty or liability for your use of this information.    Intimate Partner Violence Safety Instructions: Care Instructions  Overview     If you want to save this information but don't think it is safe to take it home, see if a trusted friend can keep it for you. Plan ahead. Know who you can call for help, and memorize the phone number.   Be careful online too. Your online activity may be seen by others. Do not use your personal computer or device to read about this topic. Use a safe computer, such as one at work, a friend's home, or a library.    When you are abused by a spouse or partner, you can take actions to protect yourself and your children.  You can increase your safety whether you decide to stay with your spouse or partner or you decide to leave. You may want to make a safety plan and pack a bag ahead of time. This will help you leave quickly when you decide to. Remember, you cannot change your partner's actions, but you can find help for you and your children. No one " deserves to be abused.  Follow-up care is a key part of your treatment and safety. Be sure to make and go to all appointments, and call your doctor if you are having problems. It's also a good idea to know your test results and keep a list of the medicines you take.  How can you care for yourself at home?  Make a plan for your safety   If you decide to stay with your abusive spouse or partner, you can do the following to increase your safety:  Decide what works best to keep you safe in an emergency.  Know who you can call to help you in an emergency.  Decide if you will call the police if you get hurt again. If you can, agree on a signal with your children or neighbor to call the police for you if you need help. You can flash lights or hang something out of a window.  Choose a safe place to go for a short time if you need to leave home. Memorize the address and phone number.  Learn escape routes out of your home in case you need to leave in a hurry. Teach your children different ways to get out of your home quickly if they need to.  If you can, hide or lock up things that can be used as weapons (guns, knives, hammers).  Learn the number of a domestic violence shelter. Talk to the people there about how they can help.  Find out about other community resources that can help you.  Take pictures of bruises or other injuries if you can. You can also take pictures of things your abuser has broken.  Teach your children that violence is never okay. Tell them that they do not deserve to be hurt.  Pack a bag   Prepare a bag with things you will need if you leave suddenly. Leave it with a friend, a relative, or someone else you trust. You could include the following items in the bag:  A set of keys to your home and car.  Emergency phone numbers and addresses.  Money such as cash or checks. You can also ask a friend, a relative, or someone else you trust to hold money for you.  Copies of legal documents such as house and car  "titles or rent receipts, birth certificates, Social Security card, voter registration, marriage and 's licenses, and your children's health records.  Personal items you would need for a few days, such as clothes, a toothbrush, toothpaste, and any medicines you or your children need.  A favorite toy or book for your child or children.  Diapers and bottles, if you have very young children.  Pictures that show signs of abuse and violence. You may also add pictures of your abuser.  If you leave   If you decide to leave, you can take the following steps:  Go to the emergency room at a hospital if you have been hurt.  Think about asking the police to be with you as you leave. They can protect you as you leave your home.  If you decide to leave secretly, remember that activities can be tracked. Your abuser may still have access to your cell phone, email, and credit cards. It may be possible for these to be traced. Always be aware of your surroundings.  If this is an emergency, do not worry about gathering up anything. Just leave--your safety is most important.  If your abuser moves out, change the locks on the doors. If you have a security system, change the access code.  When should you call for help?   Call 911 anytime you think you may need emergency care. For example, call if:    You or someone else has just been abused.     You think you or someone else is in danger of being abused.   Watch closely for changes in your health, and be sure to contact your doctor if you have any problems.  Where can you learn more?  Go to https://www.[employer].net/patiented  Enter A752 in the search box to learn more about \"Intimate Partner Violence Safety Instructions: Care Instructions.\"  Current as of: July 31, 2024  Content Version: 14.4    9924-6994 [facility] [employer].   Care instructions adapted under license by your healthcare professional. If you have questions about a medical condition or this instruction, always ask " your healthcare professional. [employer] disclaims any warranty or liability for your use of this information.    Learning About Intimate Partner Violence  What is intimate partner violence?  Intimate partner violence is a type of domestic abuse. It's threatening, emotionally harmful, or violent behavior in a personal relationship. It can happen between past or current partners or spouses. In some relationships both people abuse each other. One partner may be more abusive. Or the abuse may be equal.  Abuse can affect people of any ethnic group, race, or [place]. It can affect teens, adults, or the elderly. And it can happen to people of any sexual orientation, gender, or social status.  Abusers use fear, bullying, and threats to control their partners. They may control what their partners do. They may control where their partners go or who they see. They may act jealous, controlling, or possessive. These early signs of abuse may happen soon after the start of the relationship. Sometimes it can be hard to notice abuse at first. But after the relationship becomes more serious, the abuse may get worse.  If you are being abused in your relationship, it's important to get help. The abuse is not your fault. You don't have to face it alone.  Be careful  It may not be safe to take home domestic abuse information like this handout. Some people ask a trusted friend to keep it for them. It's also important to plan ahead and to memorize the phone number of places you can go for help. If you are concerned about your safety, do not use your computer, smartphone, or tablet to read about domestic abuse.   What are the types of intimate partner violence?  Abuse can happen in different ways. Each type can happen on its own or in combination with others.  Emotional abuse  Emotional abuse is a pattern of threats, insults, or controlling behavior. It includes verbal abuse. It goes beyond healthy disagreements in a  relationship. It's a sign of an unhealthy relationship.  Do you feel threatened, intimidated, or controlled?  Does your partner:  Threaten your children, other family members, or pets?  Use jokes meant to embarrass or shame you?  Call you names?  Tell you that you are a bad parent?  Threaten to take away your children?  Threaten to have you or your family members deported?  Control your access to money or other basic needs?  Control what you do, who you see or talk to, or where you go?  Another form of emotional abuse is denying that it is happening. Or the abuser may act like the abuse is no big deal or is your fault.  Sexual abuse  With sexual abuse, abusers may try to convince or force you to have sex. They may force you into sex acts you're not comfortable with. Or they may sexually assault you. Sexual abuse can happen even if you are in a committed relationship.  Physical abuse  Physical abuse means that a partner hits, kicks, or does something else to physically hurt you. Physical abuse that starts with a slap might lead to kicking, shoving, and choking over time. The abuser may also threaten to hurt or kill you.  Stalking  Stalking means that an abuser gives you attention that you do not want and that causes you fear. Examples of stalking include:  Following you.  Showing up at places where the abuser isn't invited, such as at your work or school.  Constantly calling or texting you.  What problems can  to?  Intimate partner violence can be very dangerous. It can cause serious, repeated injury. It can even lead to death.  All forms of abuse can cause long-term health problems from the stress of a violent relationship. Verbal abuse can lead to sexual and physical abuse.  Abuse causes:  Emotional pain.  Depression.  Anxiety.  Post-traumatic stress.  Sexual abuse can lead to sexually transmitted infections (such as HIV/AIDS) and unplanned pregnancy.  Pregnancy can be a very dangerous time for people in  "abusive relationships. Abuse can cause or increase the risk of problems during pregnancy. These include low weight gain, anemia, infections, and bleeding. Abuse may also increase your baby's risk of low birth weight, premature birth, and death.  It can be hard for some victims of abuse to ask for help or to leave their relationship. You may feel scared, stuck, or not sure what steps to take. But it's important not to ignore abuse. Talking to someone you trust could be the first step to ending the abuse and taking care of your own health and happiness again. There are resources available that can help keep you safe.  Where can you get help?  Talk to a trusted friend. Find a local advocacy group, or talk to your doctor about the abuse.  Contact the National Domestic Violence Hotline at 7-952-327-JZPS (1-824.213.4729) for more safety tips. They can guide you to groups in your area that can help. Or go to the National Coalition Against Domestic Violence website at www.[facility].org to learn more.  Domestic violence groups or a counselor in your area can help you make a safety plan for yourself and your children.  When to call for help  Call 911 anytime you think you may need emergency care. For example, call if:  You think that you or someone you know is in danger of being abused.  You have been hurt and can't have someone safely take you to emergency care.  You have just been abused.  A family member has just been abused.  Where can you learn more?  Go to https://www.[employer].net/patiented  Enter S665 in the search box to learn more about \"Learning About Intimate Partner Violence.\"  Current as of: July 31, 2024  Content Version: 14.4    7497-5771 [employer].   Care instructions adapted under license by your healthcare professional. If you have questions about a medical condition or this instruction, always ask your healthcare professional. [employer] disclaims any warranty or liability for " your use of this information.    Vaginal Bleeding During Pregnancy: Care Instructions  Overview     It's common to have some vaginal spotting when you are pregnant. In some cases, the bleeding isn't serious. And there aren't any more problems with the pregnancy.  But sometimes bleeding is a sign of a more serious problem. This is more common if the bleeding is heavy or painful. Examples of more serious problems include miscarriage, an ectopic pregnancy, and a problem with the placenta.  You may have to see your doctor again to be sure everything is okay. You may also need more tests to find the cause of the bleeding.  Home treatment may be all you need. But it depends on what is causing the bleeding. Be sure to tell your doctor if you have any new symptoms or if your symptoms get worse.  The doctor has checked you carefully, but problems can develop later. If you notice any problems or new symptoms, get medical treatment right away.  Follow-up care is a key part of your treatment and safety. Be sure to make and go to all appointments, and call your doctor if you are having problems. It's also a good idea to know your test results and keep a list of the medicines you take.  How can you care for yourself at home?  If your doctor prescribed medicines, take them exactly as directed. Call your doctor if you think you are having a problem with your medicine.  Do not have vaginal sex until your doctor says it's okay.  Do not put anything in your vagina until your doctor says it's okay.  Ask your doctor about other activities you can or can't do.  Get a lot of rest. Being pregnant can make you tired.  Do not use nonsteroidal anti-inflammatory drugs (NSAIDs), such as ibuprofen (Advil, Motrin), naproxen (Aleve), or aspirin, unless your doctor says it is okay.  When should you call for help?   Call 911 anytime you think you may need emergency care. For example, call if:    You passed out (lost consciousness).     You have  severe vaginal bleeding. This means you are soaking through a pad each hour for 2 or more hours.     You have sudden, severe pain in your belly or pelvis.   Call your doctor now or seek immediate medical care if:    You have new or worse vaginal bleeding.     You are dizzy or lightheaded, or you feel like you may faint.     You have pain in your belly, pelvis, or lower back.     You think that you are in labor.     You have a sudden release of fluid from your vagina.     You've been having regular contractions for an hour. This means that you've had at least 8 contractions within 1 hour or at least 4 contractions within 20 minutes, even after you change your position and drink fluids.     You notice that your baby has stopped moving or is moving much less than normal.   Watch closely for changes in your health, and be sure to contact your doctor if you have any problems.  Current as of: April 30, 2024  Content Version: 14.4    1602-5191 [employer].   Care instructions adapted under license by your healthcare professional. If you have questions about a medical condition or this instruction, always ask your healthcare professional. [employer] disclaims any warranty or liability for your use of this information.

## 2025-03-31 NOTE — PROGRESS NOTES
[facility] RN Prenatal Intake note  Subjective     34 year old female newly pregnant.  LMP: 25 (exact and regular cycles).     Pregnancy is planned.      Partner/support person name and relationship - Yevgeniy ,.        Symptoms since LMP include nausea, vomiting, breast tenderness, and bloating. Patient has tried these relief measures: diet modification, vitamin B-6, Unisom, and small frequent meals.    OB HISTORY  OB History    Para Term  AB Living   1 0 0 0 0 0   SAB IAB Ectopic Multiple Live Births   0 0 0 0 0      # Outcome Date GA Lbr Nikolas/2nd Weight Sex Type Anes PTL Lv   1 Current               Obstetric Comments   *First Pregnancy        OB COMPLICATIONS  *First Pregnancy    PERSONAL/SOCIAL HISTORY tab - updated all tabs in history, including current job and partner's name   and lives with their spouse.  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  Her partner works as an .     MENTAL HEALTH HISTORY:  anxiety, depression, past medication, and currently in therapy    - Current Medications    Current Outpatient Medications   Medication Sig Dispense Refill    doxylamine (UNISOM) 25 MG TABS tablet Take 0.5-1 tablets (12.5-25 mg) by mouth nightly as needed for other (nausea and vomiting in pregnancy). 30 tablet 1    hydrOXYzine HCl (ATARAX) 10 MG tablet Take 1-2 tablets (10-20 mg) by mouth 3 times daily as needed for anxiety. 90 tablet 3    pyridOXINE (VITAMIN B6) 25 MG tablet Take 1 tablet (25 mg) by mouth every 8 hours as needed (nausea and vomiting in pregnancy). 30 tablet 1    Cholecalciferol (VITAMIN D3 GUMMIES PO) Take by mouth.             - Co-morbids    Past Medical History:   Diagnosis Date    Acne vulgaris     Depression     Migraine without status migrainosus, not intractable 10/05/2021    Treatment: Excedrin         Recently improved.         - Genetic/Infection questionnaire completed, risks include autism. Discussed genetic  screening options, patient does desire first trimester genetic screening  Pt  does not have a recent known exposure to Parvo or CMV so IgG/IgM testing WILL NOT be ordered.   Flu Vaccine: Flu Vaccine Given  COVID Vaccine: Hx of COVID illness  (details below) and Has received most recent COVID booster Had covid in Jan. 2024 and reports receiving recent booster   Last pap smear: 6/4/24, due in June 2025.   - Discussed expectations for routine prenatal care and scheduling  - Reviewed CNM and MD team - patient plans to have care  Patient Provider Group choice: CNM group  - Reviewed use of triage RN team for urgent symptom review (along with contacting after hours provider), and use of "[employer] messaging for non-urgent questions, concerns or requests  - Patient was encouraged to start prenatal vitamins as tolerated, if experiencing nausea and vomiting then OK to switch to folic acid only at this time  -  Reconciled outdated diagnoses on problem list, entered current pregnancy diagnosis and any new clinical diagnoses  -Additional items: None  -Pt scheduled for dating US and NOB on 4/18/2025.      Tamar Farley, RN

## 2025-03-31 NOTE — TELEPHONE ENCOUNTER
"This RN spoke with Lidia during OB intake. She has been experiencing nausea and vomiting often and wondering about other way to help with those symptoms. She has tried unisom and vitamin b6. Takes unisom at night and vitamin B6 dring the day and night, taking about 20-25mg each dose. She reports she has not had much appetite but then won't eat then is nausea and is trying to work on smaller frequent meals. She reports she tries to eat a little something before bed, about an hour before she lays down. From anywhere from 8pm to 5am she gets up and vomits and reports that it is mostly \"bile\" She repots drinking water and electorates to balance out the vomiting. This Rn discussed trying to give herself at least an hour of \"digestion time\"  after eating and before laying down and going to sleep. Also discussed the use of propping herself on a pillow before fully laying down to try and decrease any symptoms of nausea before bed. She voiced understanding. She asked about Zofran, she has heard some things that Zofran may not be safe in pregnancy. This RN relayed what a doctor has told this RN regarding zofran \" The data surrounding zofran causing negative effects to the infant is very equivocal and so we do often still use it, . By the 2nd trimester there is really no risk as organ development is complete. \" After hearing this Lidia said she would be open to a zofran prescription. Will pend to midwife team.   "

## 2025-03-31 NOTE — LETTER
3/31/2025       RE: Lidia García  [address] [address]     Dear Colleague,    Thank you for referring your patient, Lidia García, to the [facility]. Please see a copy of my visit note below.    [facility] RN Prenatal Intake note  Subjective     34 year old female newly pregnant.  LMP: 25 (exact and regular cycles).     Pregnancy is planned.      Partner/support person name and relationship - Yevgeniy ,.        Symptoms since LMP include nausea, vomiting, breast tenderness, and bloating. Patient has tried these relief measures: diet modification, vitamin B-6, Unisom, and small frequent meals.    OB HISTORY  OB History    Para Term  AB Living   1 0 0 0 0 0   SAB IAB Ectopic Multiple Live Births   0 0 0 0 0      # Outcome Date GA Lbr Nikolas/2nd Weight Sex Type Anes PTL Lv   1 Current               Obstetric Comments   *First Pregnancy        OB COMPLICATIONS  *First Pregnancy    PERSONAL/SOCIAL HISTORY tab - updated all tabs in history, including current job and partner's name   and lives with their spouse.  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  Her partner works as an .     MENTAL HEALTH HISTORY:  anxiety, depression, past medication, and currently in therapy    - Current Medications    Current Outpatient Medications   Medication Sig Dispense Refill     doxylamine (UNISOM) 25 MG TABS tablet Take 0.5-1 tablets (12.5-25 mg) by mouth nightly as needed for other (nausea and vomiting in pregnancy). 30 tablet 1     hydrOXYzine HCl (ATARAX) 10 MG tablet Take 1-2 tablets (10-20 mg) by mouth 3 times daily as needed for anxiety. 90 tablet 3     pyridOXINE (VITAMIN B6) 25 MG tablet Take 1 tablet (25 mg) by mouth every 8 hours as needed (nausea and vomiting in pregnancy). 30 tablet 1     Cholecalciferol (VITAMIN D3 GUMMIES PO) Take by  mouth.             - Co-morbids    Past Medical History:   Diagnosis Date     Acne vulgaris      Depression      Migraine without status migrainosus, not intractable 10/05/2021    Treatment: Excedrin         Recently improved.         - Genetic/Infection questionnaire completed, risks include autism. Discussed genetic screening options, patient does desire first trimester genetic screening  Pt  does not have a recent known exposure to Parvo or CMV so IgG/IgM testing WILL NOT be ordered.   Flu Vaccine: Flu Vaccine Given  COVID Vaccine: Hx of COVID illness  (details below) and Has received most recent COVID booster Had covid in Jan. 2024 and reports receiving recent booster   Last pap smear: 6/4/24, due in June 2025.   - Discussed expectations for routine prenatal care and scheduling  - Reviewed CNM and MD team - patient plans to have care  Patient Provider Group choice: CNM group  - Reviewed use of triage RN team for urgent symptom review (along with contacting after hours provider), and use of [employer] messaging for non-urgent questions, concerns or requests  - Patient was encouraged to start prenatal vitamins as tolerated, if experiencing nausea and vomiting then OK to switch to folic acid only at this time  -  Reconciled outdated diagnoses on problem list, entered current pregnancy diagnosis and any new clinical diagnoses  -Additional items: None  -Pt scheduled for dating US and NOB on 4/18/2025.      Tamar Farley, RN      Again, thank you for allowing me to participate in the care of your patient.      Sincerely,    [facility] NURSE EDUCATION

## 2025-04-01 ENCOUNTER — TELEPHONE (OUTPATIENT)
Dept: OBGYN | Facility: CLINIC | Age: 35
End: 2025-04-01
Payer: COMMERCIAL

## 2025-04-01 ENCOUNTER — NURSE TRIAGE (OUTPATIENT)
Dept: NURSING | Facility: CLINIC | Age: 35
End: 2025-04-01
Payer: COMMERCIAL

## 2025-04-01 DIAGNOSIS — R11.0 NAUSEA: ICD-10-CM

## 2025-04-01 DIAGNOSIS — O21.9 NAUSEA AND VOMITING IN PREGNANCY: ICD-10-CM

## 2025-04-01 RX ORDER — PYRIDOXINE HCL (VITAMIN B6) 25 MG
25 TABLET ORAL EVERY 8 HOURS PRN
Qty: 30 TABLET | Refills: 1 | Status: SHIPPED | OUTPATIENT
Start: 2025-04-01

## 2025-04-01 RX ORDER — ONDANSETRON 4 MG/1
4 TABLET, ORALLY DISINTEGRATING ORAL EVERY 8 HOURS PRN
Qty: 30 TABLET | Refills: 1 | Status: SHIPPED | OUTPATIENT
Start: 2025-04-01

## 2025-04-01 NOTE — TELEPHONE ENCOUNTER
Caller reporting the following red-flag symptom(s): nausea and vomiting in pregnancy- throwing up every hour over the past 4 nights; GA: 6w 2d    Per the system red-flag symptom policy, patient was instructed to:  speak with a Registered Nurse    Action:  Patient warm transferred to a Registered Nurse

## 2025-04-01 NOTE — TELEPHONE ENCOUNTER
"OB Triage Call      Is patient's OB/Midwife with the formerly [facility] or [facility]? P- Women's Health Specialist ([facility]: Located on the 3rd floor at [facility]). At this time we do not triage for P-Women's Health Specialist. Paged on-call provider MADDIE at 8:10 am to contact patient directly. Patient directed to call back and request provider be re-paged if no response in 20 minutes.       Reason for call: vomiting    Assessment: since Friday patient has been vomiting through the night from 9:30 pm to 6 am, every 30-60 minutes, each night. During the day she has been able to keep water and the small amount of food she is eating down. Patient notes that the stomach liquid has changed from yellow to green two nights ago. Last night just a tinge of green. Last bowel movement was two days ago which is typical for her. Patient reports that she never really sees any food in her emesis, just stomach acid and water. At night patient feels like she has some acid reflux that is triggering the vomiting. Patient as a teenager was on famotidine for reflux symptoms. Denies constant abdominal pain, blood or coffee ground material in emesis.     Plan: provider input needed on disposition    Is patient's delivering hospital on divert? Does not apply due to Provider will contact patient to develop plan of care      6w2d    Estimated Date of Delivery: 2025        OB History    Para Term  AB Living   1 0 0 0 0 0   SAB IAB Ectopic Multiple Live Births   0 0 0 0 0      # Outcome Date GA Lbr Nikolas/2nd Weight Sex Type Anes PTL Lv   1 Current               Obstetric Comments   *First Pregnancy        No results found for: \"GBS\"       Mary Ann Leon RN     Reason for Disposition   [1] SEVERE vomiting (e.g., 6 or more times / day) AND [2] present > 8 hours    Additional Information   Negative: Sounds like a life-threatening emergency to the triager   Negative: [1] Vaginal bleeding " "AND [2] pregnant < 20 weeks   Negative: [1] Abdominal pain AND [2] pregnant < 20 weeks   Negative: [1] Vomiting AND [2] pregnant 20 or more weeks   Negative: [1] Vomiting AND [2] contains red blood or black (\"coffee ground\") material  (Exception: Few red streaks in vomit that only happened once.)   Negative: [1] Insulin-dependent diabetes (Type I) AND [2] glucose > 400 mg/dl (22 mmol/l)   Negative: Recent head injury (within last 3 days)   Negative: Recent abdominal injury (within last 3 days)   Negative: Severe pain in one eye    Protocols used: Pregnancy - Morning Sickness (Nausea and Vomiting of Pregnancy)-A-    "

## 2025-04-01 NOTE — TELEPHONE ENCOUNTER
This RN called and spoke to Lidia.  She reports that she was vomiting last night and seemed like mostly bile, that was yellow and green. She I concerned about her hydration status and when she should be concerned. She was up from 9:30-6am off and on vomiting. She also believes that she is experiencing  heart burn as she had some burning sensation in her stomach. She used to take famotine when she was younger for heart burn issues and wondering if heart burn is also playing  a role in her symptoms.     On call MADDIE was paged.Spoke to Jennifer Swenson CNM regarding Lidia's symptoms. This RN concerned for patient hydrational states and wanting advice on when Lidia should seek care at ED.    Called Lidia and went through when she should seek care: if she cannot keep anything down for 12 hours or more. Feeling lightheaded, dizziness related to decreased fluids and food intake. Also decreased urine output is a sign she needs to increase  her fluid intake and if it continues to happen to call as she may need IV infusion at ED.     When RN spoke to her she has last urinated about 9 hours ago. She is able to keep sips of water down and food during the day, symptoms are worst at night and did confirm she is able to drink and eat during the day although it is decreased amount from normal. Feeling like her appetite is low and trying to eat small frequent meals throughout the day as well as propping up pillow to allow chest to sit at a 45 degree angle.     Also spoke about trying OTC famotidine, tums, maylox. Zofran sent in. Discussed the use of heart burn med prior to bed then using  a zofran shortly after. Asked her to call us tomorrow and give us an update of how it went with trying anti emetic and heart burn medication.     Lidia verbalized understanding.

## 2025-04-01 NOTE — TELEPHONE ENCOUNTER
[facility] Call Center    Phone Message    May a detailed message be left on voicemail: yes     Reason for Call: Medication Question or concern regarding medication   Prescription Clarification  Name of Medication: doxylamine (UNISOM) 25 MG TABS tablet  ondansetron (ZOFRAN ODT) 4 MG ODT tab  pyridOXINE (VITAMIN B6) 25 MG tablet  Prescribing Provider: Gerson Cat   Pharmacy: [place] [id number]   What on the order needs clarification? Pt would like these prescriptions sent to this pharmacy instead.       Action Taken: Other: ump whs    Travel Screening: Not Applicable     Date of Service:

## 2025-04-12 ENCOUNTER — HOSPITAL ENCOUNTER (EMERGENCY)
Facility: CLINIC | Age: 35
Discharge: HOME | End: 2025-04-12
Attending: FAMILY MEDICINE | Admitting: FAMILY MEDICINE
Payer: COMMERCIAL

## 2025-04-12 ENCOUNTER — OFFICE VISIT (OUTPATIENT)
Dept: URGENT CARE | Facility: URGENT CARE | Age: 35
End: 2025-04-12
Payer: COMMERCIAL

## 2025-04-12 VITALS
HEART RATE: 72 BPM | SYSTOLIC BLOOD PRESSURE: 116 MMHG | DIASTOLIC BLOOD PRESSURE: 79 MMHG | OXYGEN SATURATION: 100 % | TEMPERATURE: 98.5 F | RESPIRATION RATE: 16 BRPM | BODY MASS INDEX: 22.28 KG/M2 | WEIGHT: 115 LBS

## 2025-04-12 VITALS
DIASTOLIC BLOOD PRESSURE: 59 MMHG | BODY MASS INDEX: 22.58 KG/M2 | TEMPERATURE: 97.8 F | HEART RATE: 49 BPM | HEIGHT: 60 IN | WEIGHT: 115 LBS | SYSTOLIC BLOOD PRESSURE: 93 MMHG | OXYGEN SATURATION: 100 % | RESPIRATION RATE: 16 BRPM

## 2025-04-12 DIAGNOSIS — O21.0 HYPEREMESIS GRAVIDARUM: Primary | ICD-10-CM

## 2025-04-12 DIAGNOSIS — Z3A.08 8 WEEKS GESTATION OF PREGNANCY: ICD-10-CM

## 2025-04-12 DIAGNOSIS — O21.0 HYPEREMESIS GRAVIDARUM: ICD-10-CM

## 2025-04-12 LAB
ALBUMIN SERPL BCG-MCNC: 4.6 G/DL (ref 3.5–5.2)
ALBUMIN UR-MCNC: 30 MG/DL
ALP SERPL-CCNC: 53 U/L (ref 40–150)
ALT SERPL W P-5'-P-CCNC: 11 U/L (ref 0–50)
ANION GAP SERPL CALCULATED.3IONS-SCNC: 13 MMOL/L (ref 7–15)
APPEARANCE UR: CLEAR
AST SERPL W P-5'-P-CCNC: 14 U/L (ref 0–45)
BACTERIA #/AREA URNS HPF: ABNORMAL /HPF
BASOPHILS # BLD AUTO: 0 10E3/UL (ref 0–0.2)
BASOPHILS NFR BLD AUTO: 0 %
BILIRUB SERPL-MCNC: 0.7 MG/DL
BILIRUB UR QL STRIP: ABNORMAL
BUN SERPL-MCNC: 11.2 MG/DL (ref 6–20)
CALCIUM SERPL-MCNC: 9.8 MG/DL (ref 8.8–10.4)
CHLORIDE SERPL-SCNC: 98 MMOL/L (ref 98–107)
COLOR UR AUTO: YELLOW
CREAT SERPL-MCNC: 0.62 MG/DL (ref 0.51–0.95)
EGFRCR SERPLBLD CKD-EPI 2021: >90 ML/MIN/1.73M2
EOSINOPHIL # BLD AUTO: 0 10E3/UL (ref 0–0.7)
EOSINOPHIL NFR BLD AUTO: 0 %
ERYTHROCYTE [DISTWIDTH] IN BLOOD BY AUTOMATED COUNT: 12.6 % (ref 10–15)
FLUAV RNA SPEC QL NAA+PROBE: NEGATIVE
FLUBV RNA RESP QL NAA+PROBE: NEGATIVE
GLUCOSE SERPL-MCNC: 100 MG/DL (ref 70–99)
GLUCOSE UR STRIP-MCNC: NEGATIVE MG/DL
HCO3 SERPL-SCNC: 22 MMOL/L (ref 22–29)
HCT VFR BLD AUTO: 41.8 % (ref 35–47)
HGB BLD-MCNC: 14.6 G/DL (ref 11.7–15.7)
HGB UR QL STRIP: NEGATIVE
IMM GRANULOCYTES # BLD: 0 10E3/UL
IMM GRANULOCYTES NFR BLD: 0 %
KETONES UR STRIP-MCNC: >=160 MG/DL
LEUKOCYTE ESTERASE UR QL STRIP: NEGATIVE
LYMPHOCYTES # BLD AUTO: 1.4 10E3/UL (ref 0.8–5.3)
LYMPHOCYTES NFR BLD AUTO: 13 %
MAGNESIUM SERPL-MCNC: 2.1 MG/DL (ref 1.7–2.3)
MCH RBC QN AUTO: 29.1 PG (ref 26.5–33)
MCHC RBC AUTO-ENTMCNC: 34.9 G/DL (ref 31.5–36.5)
MCV RBC AUTO: 83 FL (ref 78–100)
MONOCYTES # BLD AUTO: 0.5 10E3/UL (ref 0–1.3)
MONOCYTES NFR BLD AUTO: 5 %
MUCOUS THREADS #/AREA URNS LPF: PRESENT /LPF
NEUTROPHILS # BLD AUTO: 8.9 10E3/UL (ref 1.6–8.3)
NEUTROPHILS NFR BLD AUTO: 82 %
NITRATE UR QL: NEGATIVE
NRBC # BLD AUTO: 0 10E3/UL
NRBC BLD AUTO-RTO: 0 /100
NT-PROBNP SERPL-MCNC: <36 PG/ML (ref 0–450)
PH UR STRIP: 6 [PH] (ref 5–7)
PLATELET # BLD AUTO: 242 10E3/UL (ref 150–450)
POTASSIUM SERPL-SCNC: 3.8 MMOL/L (ref 3.4–5.3)
PROT SERPL-MCNC: 8 G/DL (ref 6.4–8.3)
RBC # BLD AUTO: 5.02 10E6/UL (ref 3.8–5.2)
RBC URINE: 1 /HPF
RSV RNA SPEC NAA+PROBE: NEGATIVE
SARS-COV-2 RNA RESP QL NAA+PROBE: NEGATIVE
SODIUM SERPL-SCNC: 133 MMOL/L (ref 135–145)
SP GR UR STRIP: 1.02 (ref 1–1.03)
SQUAMOUS EPITHELIAL: 2 /HPF
TROPONIN T SERPL HS-MCNC: <6 NG/L
UROBILINOGEN UR STRIP-MCNC: 0.2 MG/DL
WBC # BLD AUTO: 10.8 10E3/UL (ref 4–11)
WBC URINE: 4 /HPF

## 2025-04-12 PROCEDURE — 96361 HYDRATE IV INFUSION ADD-ON: CPT | Performed by: FAMILY MEDICINE

## 2025-04-12 PROCEDURE — 96376 TX/PRO/DX INJ SAME DRUG ADON: CPT | Performed by: FAMILY MEDICINE

## 2025-04-12 PROCEDURE — 84484 ASSAY OF TROPONIN QUANT: CPT | Performed by: FAMILY MEDICINE

## 2025-04-12 PROCEDURE — 99284 EMERGENCY DEPT VISIT MOD MDM: CPT | Mod: 25 | Performed by: FAMILY MEDICINE

## 2025-04-12 PROCEDURE — 258N000003 HC RX IP 258 OP 636: Performed by: FAMILY MEDICINE

## 2025-04-12 PROCEDURE — 96374 THER/PROPH/DIAG INJ IV PUSH: CPT | Performed by: FAMILY MEDICINE

## 2025-04-12 PROCEDURE — 83880 ASSAY OF NATRIURETIC PEPTIDE: CPT | Performed by: FAMILY MEDICINE

## 2025-04-12 PROCEDURE — 250N000011 HC RX IP 250 OP 636: Performed by: FAMILY MEDICINE

## 2025-04-12 PROCEDURE — 85025 COMPLETE CBC W/AUTO DIFF WBC: CPT | Performed by: FAMILY MEDICINE

## 2025-04-12 PROCEDURE — 3078F DIAST BP <80 MM HG: CPT | Performed by: INTERNAL MEDICINE

## 2025-04-12 PROCEDURE — 81001 URINALYSIS AUTO W/SCOPE: CPT | Performed by: FAMILY MEDICINE

## 2025-04-12 PROCEDURE — 99215 OFFICE O/P EST HI 40 MIN: CPT | Performed by: INTERNAL MEDICINE

## 2025-04-12 PROCEDURE — 82310 ASSAY OF CALCIUM: CPT | Performed by: FAMILY MEDICINE

## 2025-04-12 PROCEDURE — 83735 ASSAY OF MAGNESIUM: CPT | Performed by: FAMILY MEDICINE

## 2025-04-12 PROCEDURE — 99284 EMERGENCY DEPT VISIT MOD MDM: CPT | Performed by: FAMILY MEDICINE

## 2025-04-12 PROCEDURE — 3074F SYST BP LT 130 MM HG: CPT | Performed by: INTERNAL MEDICINE

## 2025-04-12 PROCEDURE — 93005 ELECTROCARDIOGRAM TRACING: CPT | Performed by: FAMILY MEDICINE

## 2025-04-12 PROCEDURE — 87637 SARSCOV2&INF A&B&RSV AMP PRB: CPT | Performed by: FAMILY MEDICINE

## 2025-04-12 PROCEDURE — 36415 COLL VENOUS BLD VENIPUNCTURE: CPT | Performed by: FAMILY MEDICINE

## 2025-04-12 RX ORDER — ONDANSETRON 2 MG/ML
4 INJECTION INTRAMUSCULAR; INTRAVENOUS ONCE
Status: COMPLETED | OUTPATIENT
Start: 2025-04-12 | End: 2025-04-12

## 2025-04-12 RX ADMIN — SODIUM CHLORIDE 1000 ML: 9 INJECTION, SOLUTION INTRAVENOUS at 12:20

## 2025-04-12 RX ADMIN — ONDANSETRON 4 MG: 2 INJECTION INTRAMUSCULAR; INTRAVENOUS at 12:19

## 2025-04-12 RX ADMIN — SODIUM CHLORIDE 1000 ML: 9 INJECTION, SOLUTION INTRAVENOUS at 15:03

## 2025-04-12 RX ADMIN — ONDANSETRON 4 MG: 2 INJECTION INTRAMUSCULAR; INTRAVENOUS at 15:53

## 2025-04-12 ASSESSMENT — COLUMBIA-SUICIDE SEVERITY RATING SCALE - C-SSRS
1. IN THE PAST MONTH, HAVE YOU WISHED YOU WERE DEAD OR WISHED YOU COULD GO TO SLEEP AND NOT WAKE UP?: NO
2. HAVE YOU ACTUALLY HAD ANY THOUGHTS OF KILLING YOURSELF IN THE PAST MONTH?: NO
6. HAVE YOU EVER DONE ANYTHING, STARTED TO DO ANYTHING, OR PREPARED TO DO ANYTHING TO END YOUR LIFE?: NO

## 2025-04-12 ASSESSMENT — ACTIVITIES OF DAILY LIVING (ADL)
ADLS_ACUITY_SCORE: 41

## 2025-04-12 NOTE — PROGRESS NOTES
Urgent Care Clinic Visit    Chief Complaint   Patient presents with    Urgent Care     8 week pregnant trouble eating not keeping anything down took some sofaran but she couldn't                4/12/2025     9:20 AM   Additional Questions   Roomed by Natividad SULLIVAN MA         4/12/2025     9:20 AM   Patient Reported Additional Medications   Patient reports taking the following new medications none

## 2025-04-12 NOTE — ED TRIAGE NOTES
Patient is 8 weeks pregnant with her first child, started having nausea 3 weeks ago that has been managed with zofran. Has been vomiting constantly for the last 24 hours and unable to keep any fluids or nutrition down.

## 2025-04-12 NOTE — PATIENT INSTRUCTIONS
Patient presents with hyperemesis gravidarum in first trimester having failed standard outpatient approaches for management.  Postural lightheadedness -- hemodynamically and vitally stable. Referred to [facility] for further assessment and IV rehydration.  Case discussed with Dr. Bella. Thank you!

## 2025-04-12 NOTE — ED PROVIDER NOTES
ED Provider Note  [facility]      History     Chief Complaint   Patient presents with    Emesis During Pregnancy     HPI  Lidia García is a 34 year old female  (7w6d) who presents to the emergency department with nausea and vomiting.  Patient started having nausea 3 weeks ago that has been managed with Zofran.  She has been vomiting constantly for the last 24 hours and unable to keep any fluids or food down.    Past Medical History  Past Medical History:   Diagnosis Date    Acne vulgaris     Depression     Migraine without status migrainosus, not intractable 10/05/2021    Treatment: Excedrin         Recently improved.       Past Surgical History:   Procedure Laterality Date    wisdom teeth       and      Cholecalciferol (VITAMIN D3 GUMMIES PO)  doxylamine (UNISOM) 25 MG TABS tablet  hydrOXYzine HCl (ATARAX) 10 MG tablet  ondansetron (ZOFRAN ODT) 4 MG ODT tab  pyridOXINE (VITAMIN B6) 25 MG tablet      No Known Allergies  Family History  Family History   Problem Relation Age of Onset    Asthma Mother     Eczema Mother     Hyperlipidemia Mother     Anxiety Disorder Mother     Diabetes Father     Hypertension Father     Depression Father     No Known Problems Brother     Anxiety Disorder Brother     Hernia Brother      Social History   Social History     Tobacco Use    Smoking status: Never    Smokeless tobacco: Never   Vaping Use    Vaping status: Never Used   Substance Use Topics    Alcohol use: Yes     Alcohol/week: 3.0 standard drinks of alcohol     Types: 3 Standard drinks or equivalent per week    Drug use: Never      A medically appropriate review of systems was performed with pertinent positives and negatives noted in the HPI, and all other systems negative.    Physical Exam   BP: 108/74  Pulse: 65  Temp: 97.8  F (36.6  C)  Resp: 16  Height: 152.4 cm (5')  Weight: 52.2 kg (115 lb)  SpO2: 100 %  Physical Exam  Constitutional:       General: She is not in acute  distress.     Appearance: Normal appearance. She is not diaphoretic.   HENT:      Head: Atraumatic.      Mouth/Throat:      Mouth: Mucous membranes are moist.   Eyes:      General: No scleral icterus.     Conjunctiva/sclera: Conjunctivae normal.   Cardiovascular:      Rate and Rhythm: Normal rate.      Heart sounds: Normal heart sounds.   Pulmonary:      Effort: No respiratory distress.      Breath sounds: Normal breath sounds.   Abdominal:      General: Abdomen is flat.      Tenderness: There is no abdominal tenderness. There is no right CVA tenderness, left CVA tenderness, guarding or rebound.   Musculoskeletal:      Cervical back: Neck supple.   Skin:     General: Skin is warm.      Findings: No rash.   Neurological:      Mental Status: She is alert.           ED Course, Procedures, & Data      Procedures     Results for orders placed or performed during the hospital encounter of 04/12/25   Comprehensive metabolic panel     Status: Abnormal   Result Value Ref Range    Sodium 133 (L) 135 - 145 mmol/L    Potassium 3.8 3.4 - 5.3 mmol/L    Carbon Dioxide (CO2) 22 22 - 29 mmol/L    Anion Gap 13 7 - 15 mmol/L    Urea Nitrogen 11.2 6.0 - 20.0 mg/dL    Creatinine 0.62 0.51 - 0.95 mg/dL    GFR Estimate >90 >60 mL/min/1.73m2    Calcium 9.8 8.8 - 10.4 mg/dL    Chloride 98 98 - 107 mmol/L    Glucose 100 (H) 70 - 99 mg/dL    Alkaline Phosphatase 53 40 - 150 U/L    AST 14 0 - 45 U/L    ALT 11 0 - 50 U/L    Protein Total 8.0 6.4 - 8.3 g/dL    Albumin 4.6 3.5 - 5.2 g/dL    Bilirubin Total 0.7 <=1.2 mg/dL   UA with Microscopic reflex to Culture     Status: Abnormal    Specimen: Urine, Midstream   Result Value Ref Range    Color Urine Yellow Colorless, Straw, Light Yellow, Yellow    Appearance Urine Clear Clear    Glucose Urine Negative Negative mg/dL    Bilirubin Urine Small (A) Negative    Ketones Urine >=160 (A) Negative mg/dL    Specific Gravity Urine 1.025 1.003 - 1.035    Blood Urine Negative Negative    pH Urine 6.0 5.0 -  7.0    Protein Albumin Urine 30 (A) Negative mg/dL    Urobilinogen Urine 0.2 0.2, 1.0 mg/dL    Nitrite Urine Negative Negative    Leukocyte Esterase Urine Negative Negative    Bacteria Urine Moderate (A) None Seen /HPF    Mucus Urine Present (A) None Seen /LPF    RBC Urine 1 <=2 /HPF    WBC Urine 4 <=5 /HPF    Squamous Epithelials Urine 2 (H) <=1 /HPF    Narrative    Urine Culture not indicated   CBC with platelets and differential     Status: Abnormal   Result Value Ref Range    WBC Count 10.8 4.0 - 11.0 10e3/uL    RBC Count 5.02 3.80 - 5.20 10e6/uL    Hemoglobin 14.6 11.7 - 15.7 g/dL    Hematocrit 41.8 35.0 - 47.0 %    MCV 83 78 - 100 fL    MCH 29.1 26.5 - 33.0 pg    MCHC 34.9 31.5 - 36.5 g/dL    RDW 12.6 10.0 - 15.0 %    Platelet Count 242 150 - 450 10e3/uL    % Neutrophils 82 %    % Lymphocytes 13 %    % Monocytes 5 %    % Eosinophils 0 %    % Basophils 0 %    % Immature Granulocytes 0 %    NRBCs per 100 WBC 0 <1 /100    Absolute Neutrophils 8.9 (H) 1.6 - 8.3 10e3/uL    Absolute Lymphocytes 1.4 0.8 - 5.3 10e3/uL    Absolute Monocytes 0.5 0.0 - 1.3 10e3/uL    Absolute Eosinophils 0.0 0.0 - 0.7 10e3/uL    Absolute Basophils 0.0 0.0 - 0.2 10e3/uL    Absolute Immature Granulocytes 0.0 <=0.4 10e3/uL    Absolute NRBCs 0.0 10e3/uL   Troponin T, High Sensitivity     Status: Normal   Result Value Ref Range    Troponin T, High Sensitivity <6 <=14 ng/L   Nt probnp inpatient (BNP)     Status: Normal   Result Value Ref Range    N terminal Pro BNP Inpatient <36 0 - 450 pg/mL   Magnesium     Status: Normal   Result Value Ref Range    Magnesium 2.1 1.7 - 2.3 mg/dL   Influenza A/B, RSV and SARS-CoV2 PCR (COVID-19) Nose     Status: Normal    Specimen: Nose; Swab   Result Value Ref Range    Influenza A PCR Negative Negative    Influenza B PCR Negative Negative    RSV PCR Negative Negative    SARS CoV2 PCR Negative Negative    Narrative    Testing was performed using the Xpert Xpress CoV2/Flu/RSV Assay on the [employer]  Instrument. This test should be ordered for the detection of SARS-CoV2, influenza, and RSV viruses in individuals with signs and symptoms of respiratory tract infection. This test is for in vitro diagnostic use under the [place] FDA for laboratories certified under CLIA to perform high or moderate complexity testing. This test has been [place] FDA cleared. A negative result does not rule out the presence of PCR inhibitors in the specimen or target RNA in concentration below the limit of detection for the assay. If only one viral target is positive but coinfection with multiple targets is suspected, the sample should be re-tested with another FDA cleared, approved, or authorized test, if coninfection would change clinical management. This test was validated by the [facility]. These laboratories are certified under the Clinical Laboratory Improvement Amendments of 1988 (CLIA-88) as qualified to perfom high complexity laboratory testing.   EKG 12-lead, tracing only     Status: None (Preliminary result)   Result Value Ref Range    Systolic Blood Pressure  mmHg    Diastolic Blood Pressure  mmHg    Ventricular Rate 57 BPM    Atrial Rate 57 BPM    DE Interval 124 ms    QRS Duration 76 ms     ms    QTc 428 ms    P Axis 22 degrees    R AXIS 85 degrees    T Axis 42 degrees    Interpretation ECG       Sinus bradycardia  Otherwise normal ECG    Unconfirmed report - interpretation of this ECG is computer generated - see medical record for final interpretation     CBC with platelets differential     Status: Abnormal    Narrative    The following orders were created for panel order CBC with platelets differential.  Procedure                               Abnormality         Status                     ---------                               -----------         ------                     CBC with platelets and ...[[id number]]  Abnormal            Final result                 Please view results for these tests on the  individual orders.     Medications   sodium chloride 0.9% BOLUS 1,000 mL (0 mLs Intravenous Stopped 4/12/25 1439)   ondansetron (ZOFRAN) injection 4 mg (4 mg Intravenous $Given 4/12/25 1219)   sodium chloride 0.9% BOLUS 1,000 mL (0 mLs Intravenous Stopped 4/12/25 1654)   ondansetron (ZOFRAN) injection 4 mg (4 mg Intravenous $Given 4/12/25 1553)     Labs Ordered and Resulted from Time of ED Arrival to Time of ED Departure   COMPREHENSIVE METABOLIC PANEL - Abnormal       Result Value    Sodium 133 (*)     Potassium 3.8      Carbon Dioxide (CO2) 22      Anion Gap 13      Urea Nitrogen 11.2      Creatinine 0.62      GFR Estimate >90      Calcium 9.8      Chloride 98      Glucose 100 (*)     Alkaline Phosphatase 53      AST 14      ALT 11      Protein Total 8.0      Albumin 4.6      Bilirubin Total 0.7     ROUTINE UA WITH MICROSCOPIC REFLEX TO CULTURE - Abnormal    Color Urine Yellow      Appearance Urine Clear      Glucose Urine Negative      Bilirubin Urine Small (*)     Ketones Urine >=160 (*)     Specific Gravity Urine 1.025      Blood Urine Negative      pH Urine 6.0      Protein Albumin Urine 30 (*)     Urobilinogen Urine 0.2      Nitrite Urine Negative      Leukocyte Esterase Urine Negative      Bacteria Urine Moderate (*)     Mucus Urine Present (*)     RBC Urine 1      WBC Urine 4      Squamous Epithelials Urine 2 (*)    CBC WITH PLATELETS AND DIFFERENTIAL - Abnormal    WBC Count 10.8      RBC Count 5.02      Hemoglobin 14.6      Hematocrit 41.8      MCV 83      MCH 29.1      MCHC 34.9      RDW 12.6      Platelet Count 242      % Neutrophils 82      % Lymphocytes 13      % Monocytes 5      % Eosinophils 0      % Basophils 0      % Immature Granulocytes 0      NRBCs per 100 WBC 0      Absolute Neutrophils 8.9 (*)     Absolute Lymphocytes 1.4      Absolute Monocytes 0.5      Absolute Eosinophils 0.0      Absolute Basophils 0.0      Absolute Immature Granulocytes 0.0      Absolute NRBCs 0.0     TROPONIN T, HIGH  SENSITIVITY - Normal    Troponin T, High Sensitivity <6     NT PROBNP INPATIENT - Normal    N terminal Pro BNP Inpatient <36     MAGNESIUM - Normal    Magnesium 2.1     INFLUENZA A/B, RSV AND SARS-COV2 PCR - Normal    Influenza A PCR Negative      Influenza B PCR Negative      RSV PCR Negative      SARS CoV2 PCR Negative       No orders to display          Critical care was not performed.     Medical Decision Making  The patient's presentation was of moderate complexity (an acute illness with systemic symptoms).    The patient's evaluation involved:  ordering and/or review of 3+ test(s) in this encounter (see separate area of note for details)    The patient's management necessitated moderate risk (prescription drug management including medications given in the ED).    Assessment & Plan        I have reviewed the nursing notes. I have reviewed the findings, diagnosis, plan and need for follow up with the patient.        Final diagnoses:   Hyperemesis gravidarum   8 weeks gestation of pregnancy       Victor Manuel Salazar MD  [facility] EMERGENCY DEPARTMENT  4/12/2025     Victor Manuel Salazar MD  04/13/25 4430

## 2025-04-12 NOTE — DISCHARGE INSTRUCTIONS
Discharge to home continue with Zofran for nausea follow-up this week with your primary OB/GYN clinic for longer-term plan and management of ongoing nausea and vomiting

## 2025-04-12 NOTE — PROGRESS NOTES
SUBJECTIVE:  Chief complaint of nausea in the context of first trimester of pregnancy. The nausea has been present for several weeks.  Scheduled with OB for her first ultrasound in the next week or so.  Has tried some doxylamine and B6 without too much benefit.  Ondansetron has been more helpful.  In the past 24 hours, she has noted more challenges with vomiting despite using ondansetron ODT and she then vomited 20 minutes later. She has been vomiting frequently.  Noting some postural lightheadedness, weakness.     Wt Readings from Last 4 Encounters:   04/12/25 52.2 kg (115 lb)   11/21/24 56.2 kg (124 lb)   06/04/24 55.2 kg (121 lb 11.2 oz)   05/09/23 57.7 kg (127 lb 1.6 oz)     OBJECTIVE:  /79   Pulse 72   Temp 98.5  F (36.9  C) (Temporal)   Resp 16   Wt 52.2 kg (115 lb)   LMP 02/16/2025   SpO2 100%   BMI 22.28 kg/m    GENERAL: alert and oriented; vomiting periodically  Remainder of exam deferred    ASSESSMENT/PLAN:    ICD-10-CM    1. Hyperemesis gravidarum  O21.0         Patient Instructions   Patient presents with hyperemesis gravidarum in first trimester having failed standard outpatient approaches for management.  Postural lightheadedness -- hemodynamically and vitally stable. Referred to [facility] Room for further assessment and IV rehydration.  Case discussed with Dr. Bella.     Milind Quiroz MD

## 2025-04-13 LAB
ATRIAL RATE - MUSE: 57 BPM
DIASTOLIC BLOOD PRESSURE - MUSE: NORMAL MMHG
INTERPRETATION ECG - MUSE: NORMAL
P AXIS - MUSE: 22 DEGREES
PR INTERVAL - MUSE: 124 MS
QRS DURATION - MUSE: 76 MS
QT - MUSE: 440 MS
QTC - MUSE: 428 MS
R AXIS - MUSE: 85 DEGREES
SYSTOLIC BLOOD PRESSURE - MUSE: NORMAL MMHG
T AXIS - MUSE: 42 DEGREES
VENTRICULAR RATE- MUSE: 57 BPM

## 2025-04-14 ENCOUNTER — TELEPHONE (OUTPATIENT)
Dept: OBGYN | Facility: CLINIC | Age: 35
End: 2025-04-14
Payer: COMMERCIAL

## 2025-04-14 NOTE — TELEPHONE ENCOUNTER
[facility] Call Center    Phone Message    May a detailed message be left on voicemail: yes     Reason for Call: Other: Pt went to the ER over the weekend for dehydration and was diagnosed with Hyperemesis gravidarum. She has her first ob appt this Friday (4/18), but just wanted to let her provider know and follow-up with her if needed. Please advise.      Action Taken: Other: ump whs    Travel Screening: Not Applicable     Date of Service:

## 2025-04-16 DIAGNOSIS — Z32.01 PREGNANCY TEST POSITIVE: Primary | ICD-10-CM

## 2025-04-17 PROBLEM — O09.91 SUPERVISION OF HIGH RISK PREGNANCY IN FIRST TRIMESTER: Status: ACTIVE | Noted: 2025-04-17

## 2025-04-17 PROBLEM — O09.521 MULTIGRAVIDA OF ADVANCED MATERNAL AGE IN FIRST TRIMESTER: Status: ACTIVE | Noted: 2025-04-17

## 2025-04-17 PROBLEM — Z97.5 IUD (INTRAUTERINE DEVICE) IN PLACE: Status: RESOLVED | Noted: 2024-03-13 | Resolved: 2025-04-17

## 2025-04-18 ENCOUNTER — ANCILLARY PROCEDURE (OUTPATIENT)
Dept: ULTRASOUND IMAGING | Facility: CLINIC | Age: 35
End: 2025-04-18
Attending: ADVANCED PRACTICE MIDWIFE
Payer: COMMERCIAL

## 2025-04-18 ENCOUNTER — APPOINTMENT (OUTPATIENT)
Dept: LAB | Facility: CLINIC | Age: 35
End: 2025-04-18
Attending: ADVANCED PRACTICE MIDWIFE
Payer: COMMERCIAL

## 2025-04-18 DIAGNOSIS — Z32.01 PREGNANCY TEST POSITIVE: ICD-10-CM

## 2025-04-18 PROBLEM — F41.9 ANXIETY: Status: ACTIVE | Noted: 2024-11-21

## 2025-04-18 PROBLEM — F33.0 MILD EPISODE OF RECURRENT MAJOR DEPRESSIVE DISORDER: Status: ACTIVE | Noted: 2021-10-05

## 2025-04-18 PROBLEM — D25.1 INTRAMURAL AND SUBSEROUS LEIOMYOMA OF UTERUS: Status: ACTIVE | Noted: 2025-04-18

## 2025-04-18 PROBLEM — D25.2 INTRAMURAL AND SUBSEROUS LEIOMYOMA OF UTERUS: Status: ACTIVE | Noted: 2025-04-18

## 2025-04-18 PROCEDURE — 76817 TRANSVAGINAL US OBSTETRIC: CPT | Mod: 26 | Performed by: STUDENT IN AN ORGANIZED HEALTH CARE EDUCATION/TRAINING PROGRAM

## 2025-04-18 PROCEDURE — 76817 TRANSVAGINAL US OBSTETRIC: CPT

## 2025-04-22 ENCOUNTER — TRANSCRIBE ORDERS (OUTPATIENT)
Dept: MATERNAL FETAL MEDICINE | Facility: CLINIC | Age: 35
End: 2025-04-22
Payer: COMMERCIAL

## 2025-04-22 DIAGNOSIS — O26.90 PREGNANCY RELATED CONDITION, ANTEPARTUM: Primary | ICD-10-CM

## 2025-04-27 ENCOUNTER — MYC MEDICAL ADVICE (OUTPATIENT)
Dept: OBGYN | Facility: CLINIC | Age: 35
End: 2025-04-27
Payer: COMMERCIAL

## 2025-04-29 DIAGNOSIS — O21.0 HYPEREMESIS GRAVIDARUM: Primary | ICD-10-CM

## 2025-04-29 DIAGNOSIS — O09.91 SUPERVISION OF HIGH RISK PREGNANCY IN FIRST TRIMESTER: ICD-10-CM

## 2025-04-29 RX ORDER — HEPARIN SODIUM,PORCINE 10 UNIT/ML
5-20 VIAL (ML) INTRAVENOUS DAILY PRN
OUTPATIENT
Start: 2025-04-30

## 2025-04-29 RX ORDER — ONDANSETRON 2 MG/ML
4 INJECTION INTRAMUSCULAR; INTRAVENOUS ONCE
OUTPATIENT
Start: 2025-04-30 | End: 2025-04-30

## 2025-04-29 RX ORDER — HEPARIN SODIUM (PORCINE) LOCK FLUSH IV SOLN 100 UNIT/ML 100 UNIT/ML
5 SOLUTION INTRAVENOUS
OUTPATIENT
Start: 2025-04-30

## 2025-05-05 ENCOUNTER — INFUSION THERAPY VISIT (OUTPATIENT)
Dept: INFUSION THERAPY | Facility: CLINIC | Age: 35
End: 2025-05-05
Attending: ADVANCED PRACTICE MIDWIFE
Payer: COMMERCIAL

## 2025-05-05 VITALS
RESPIRATION RATE: 16 BRPM | DIASTOLIC BLOOD PRESSURE: 54 MMHG | OXYGEN SATURATION: 98 % | TEMPERATURE: 98.5 F | HEART RATE: 66 BPM | SYSTOLIC BLOOD PRESSURE: 94 MMHG

## 2025-05-05 DIAGNOSIS — O21.0 HYPEREMESIS GRAVIDARUM: ICD-10-CM

## 2025-05-05 DIAGNOSIS — O09.91 SUPERVISION OF HIGH RISK PREGNANCY IN FIRST TRIMESTER: Primary | ICD-10-CM

## 2025-05-05 PROCEDURE — 96374 THER/PROPH/DIAG INJ IV PUSH: CPT

## 2025-05-05 PROCEDURE — 96361 HYDRATE IV INFUSION ADD-ON: CPT

## 2025-05-05 PROCEDURE — 250N000011 HC RX IP 250 OP 636: Performed by: ADVANCED PRACTICE MIDWIFE

## 2025-05-05 PROCEDURE — 258N000003 HC RX IP 258 OP 636: Performed by: ADVANCED PRACTICE MIDWIFE

## 2025-05-05 RX ORDER — ONDANSETRON 2 MG/ML
4 INJECTION INTRAMUSCULAR; INTRAVENOUS ONCE
Status: COMPLETED | OUTPATIENT
Start: 2025-05-05 | End: 2025-05-05

## 2025-05-05 RX ORDER — HEPARIN SODIUM (PORCINE) LOCK FLUSH IV SOLN 100 UNIT/ML 100 UNIT/ML
5 SOLUTION INTRAVENOUS
Status: CANCELLED | OUTPATIENT
Start: 2025-05-05

## 2025-05-05 RX ORDER — ONDANSETRON 2 MG/ML
4 INJECTION INTRAMUSCULAR; INTRAVENOUS ONCE
Status: CANCELLED | OUTPATIENT
Start: 2025-05-05 | End: 2025-05-05

## 2025-05-05 RX ORDER — HEPARIN SODIUM,PORCINE 10 UNIT/ML
5-20 VIAL (ML) INTRAVENOUS DAILY PRN
Status: CANCELLED | OUTPATIENT
Start: 2025-05-05

## 2025-05-05 RX ADMIN — SODIUM CHLORIDE, SODIUM LACTATE, POTASSIUM CHLORIDE, AND CALCIUM CHLORIDE 1000 ML: .6; .31; .03; .02 INJECTION, SOLUTION INTRAVENOUS at 14:20

## 2025-05-05 RX ADMIN — ONDANSETRON 4 MG: 2 INJECTION INTRAMUSCULAR; INTRAVENOUS at 14:25

## 2025-05-05 ASSESSMENT — PAIN SCALES - GENERAL: PAINLEVEL_OUTOF10: NO PAIN (0)

## 2025-05-05 NOTE — PROGRESS NOTES
Infusion Nursing Note:  Lidia García presents today for LR infusion and Zofran.    Patient seen by provider today: No   present during visit today: Not Applicable.    Note: Pt Bp before infusion in the low 90s (94/54) and Bp with no improvement after LR infusion- 91/57! Pt denies all symptoms and signs related to low bp.      Intravenous Access:  Peripheral IV placed.    Treatment Conditions:  Not Applicable.      Post Infusion Assessment:  Site patent and intact, free from redness, edema or discomfort.  No evidence of extravasations.  Access discontinued per protocol.       Discharge Plan:   Patient and/or family verbalized understanding of discharge instructions and all questions answered.  Patient discharged in stable condition accompanied by: self.  Departure Mode: Ambulatory.      RITESH OROZCO RN

## 2025-05-06 DIAGNOSIS — O21.0 HYPEREMESIS GRAVIDARUM: Primary | ICD-10-CM

## 2025-05-06 RX ORDER — ONDANSETRON 2 MG/ML
4 INJECTION INTRAMUSCULAR; INTRAVENOUS
Status: CANCELLED | OUTPATIENT
Start: 2025-05-09

## 2025-05-06 RX ORDER — HEPARIN SODIUM (PORCINE) LOCK FLUSH IV SOLN 100 UNIT/ML 100 UNIT/ML
5 SOLUTION INTRAVENOUS
OUTPATIENT
Start: 2025-05-09

## 2025-05-06 RX ORDER — HEPARIN SODIUM,PORCINE 10 UNIT/ML
5-20 VIAL (ML) INTRAVENOUS DAILY PRN
Status: CANCELLED | OUTPATIENT
Start: 2025-05-06

## 2025-05-06 RX ORDER — HEPARIN SODIUM (PORCINE) LOCK FLUSH IV SOLN 100 UNIT/ML 100 UNIT/ML
5 SOLUTION INTRAVENOUS
Status: CANCELLED | OUTPATIENT
Start: 2025-05-06

## 2025-05-06 RX ORDER — HEPARIN SODIUM,PORCINE 10 UNIT/ML
5-20 VIAL (ML) INTRAVENOUS DAILY PRN
OUTPATIENT
Start: 2025-05-09

## 2025-05-08 RX ORDER — ONDANSETRON 2 MG/ML
4 INJECTION INTRAMUSCULAR; INTRAVENOUS
OUTPATIENT
Start: 2025-05-09

## 2025-05-12 ENCOUNTER — PRE VISIT (OUTPATIENT)
Dept: MATERNAL FETAL MEDICINE | Facility: CLINIC | Age: 35
End: 2025-05-12
Payer: COMMERCIAL

## 2025-05-13 ENCOUNTER — MYC MEDICAL ADVICE (OUTPATIENT)
Dept: OBGYN | Facility: CLINIC | Age: 35
End: 2025-05-13
Payer: COMMERCIAL

## 2025-05-14 ENCOUNTER — TELEPHONE (OUTPATIENT)
Dept: OBGYN | Facility: CLINIC | Age: 35
End: 2025-05-14
Payer: COMMERCIAL

## 2025-05-14 DIAGNOSIS — O09.91 SUPERVISION OF HIGH RISK PREGNANCY IN FIRST TRIMESTER: Primary | ICD-10-CM

## 2025-05-14 DIAGNOSIS — O21.0 HYPEREMESIS GRAVIDARUM: ICD-10-CM

## 2025-05-14 DIAGNOSIS — O21.9 NAUSEA AND VOMITING IN PREGNANCY: ICD-10-CM

## 2025-05-14 NOTE — TELEPHONE ENCOUNTER
[facility] Call Center    Phone Message    May a detailed message be left on voicemail: yes     Reason for Call: Pt calling regarding recent mychart message pt sent to care team,  pt has scheduled a few more IV infusions for hydration, which she hopes will get her  through the next month. During pt last OB visit with emmy Swenson mentioned that they would add B vitamins (a banana bag?) to pt infusion order, but it looks like the infusion clinic doesn't see this. Would it be possible to add this to order? Pt states this is time sensitive, please advise.         Action Taken: Message routed to:  Other: [facility]    Travel Screening: Not Applicable

## 2025-05-14 NOTE — TELEPHONE ENCOUNTER
"Called infusion center and inquired if order was sufficient. Infusion RN recommended ordering vitamins as its own separate order as current nursing order is insufficient.     Recommended calling pharmacy to inquire how to order. Called inpatient pharmacy who reports this can be ordered by typing in \"banana bag\" under orders. A normal banana bag consists of thiamine 100 mg, folic acid 1 mg and multivitamin.     Pended and routed to CNM team.  "

## 2025-05-16 ENCOUNTER — LAB (OUTPATIENT)
Dept: LAB | Facility: CLINIC | Age: 35
End: 2025-05-16
Attending: OBSTETRICS & GYNECOLOGY
Payer: COMMERCIAL

## 2025-05-16 ENCOUNTER — RESULTS FOLLOW-UP (OUTPATIENT)
Dept: OBGYN | Facility: CLINIC | Age: 35
End: 2025-05-16

## 2025-05-16 ENCOUNTER — HOSPITAL ENCOUNTER (OUTPATIENT)
Dept: ULTRASOUND IMAGING | Facility: CLINIC | Age: 35
Discharge: HOME | End: 2025-05-16
Attending: OBSTETRICS & GYNECOLOGY
Payer: COMMERCIAL

## 2025-05-16 DIAGNOSIS — O26.90 PREGNANCY RELATED CONDITION, ANTEPARTUM: ICD-10-CM

## 2025-05-16 DIAGNOSIS — Z31.438 ENCOUNTER FOR OTHER GENETIC TESTING OF FEMALE FOR PROCREATIVE MANAGEMENT: ICD-10-CM

## 2025-05-16 DIAGNOSIS — O09.511 SUPERVISION OF ELDERLY PRIMIGRAVIDA IN FIRST TRIMESTER: ICD-10-CM

## 2025-05-16 PROCEDURE — 76813 OB US NUCHAL MEAS 1 GEST: CPT

## 2025-05-16 PROCEDURE — 36415 COLL VENOUS BLD VENIPUNCTURE: CPT

## 2025-05-19 DIAGNOSIS — O09.91 SUPERVISION OF HIGH RISK PREGNANCY IN FIRST TRIMESTER: ICD-10-CM

## 2025-05-19 DIAGNOSIS — O21.0 HYPEREMESIS GRAVIDARUM: Primary | ICD-10-CM

## 2025-05-19 RX ORDER — EPINEPHRINE 1 MG/ML
0.3 INJECTION, SOLUTION INTRAMUSCULAR; SUBCUTANEOUS EVERY 5 MIN PRN
OUTPATIENT
Start: 2025-05-19

## 2025-05-19 RX ORDER — ALBUTEROL SULFATE 90 UG/1
1-2 INHALANT RESPIRATORY (INHALATION)
Start: 2025-05-19

## 2025-05-19 RX ORDER — ALBUTEROL SULFATE 0.83 MG/ML
2.5 SOLUTION RESPIRATORY (INHALATION)
OUTPATIENT
Start: 2025-05-19

## 2025-05-19 RX ORDER — HEPARIN SODIUM (PORCINE) LOCK FLUSH IV SOLN 100 UNIT/ML 100 UNIT/ML
5 SOLUTION INTRAVENOUS
OUTPATIENT
Start: 2025-05-19

## 2025-05-19 RX ORDER — HEPARIN SODIUM,PORCINE 10 UNIT/ML
5-20 VIAL (ML) INTRAVENOUS DAILY PRN
OUTPATIENT
Start: 2025-05-19

## 2025-05-19 RX ORDER — METHYLPREDNISOLONE SODIUM SUCCINATE 40 MG/ML
40 INJECTION INTRAMUSCULAR; INTRAVENOUS
Start: 2025-05-19

## 2025-05-19 RX ORDER — MEPERIDINE HYDROCHLORIDE 25 MG/ML
25 INJECTION INTRAMUSCULAR; INTRAVENOUS; SUBCUTANEOUS
OUTPATIENT
Start: 2025-05-19

## 2025-05-19 RX ORDER — DIPHENHYDRAMINE HYDROCHLORIDE 50 MG/ML
50 INJECTION, SOLUTION INTRAMUSCULAR; INTRAVENOUS
Start: 2025-05-19

## 2025-05-19 RX ORDER — DIPHENHYDRAMINE HYDROCHLORIDE 50 MG/ML
25 INJECTION, SOLUTION INTRAMUSCULAR; INTRAVENOUS
Start: 2025-05-19

## 2025-05-20 ENCOUNTER — TELEPHONE (OUTPATIENT)
Dept: OBGYN | Facility: CLINIC | Age: 35
End: 2025-05-20
Payer: COMMERCIAL

## 2025-05-20 DIAGNOSIS — R11.0 NAUSEA: ICD-10-CM

## 2025-05-20 RX ORDER — ONDANSETRON 4 MG/1
4 TABLET, ORALLY DISINTEGRATING ORAL EVERY 8 HOURS PRN
Qty: 30 TABLET | Refills: 1 | Status: SHIPPED | OUTPATIENT
Start: 2025-05-20

## 2025-05-20 NOTE — TELEPHONE ENCOUNTER
"Received refill request for zofran. Pt last seen in clinic 5/9/25.   \"Return to clinic in 4 weeks and prn if questions or concerns. \" From last visit note.     Next appointment is on 6/20/25.    "

## 2025-05-20 NOTE — TELEPHONE ENCOUNTER
[facility] Call [facility]    Phone Message    May a detailed message be left on voicemail: yes     Reason for Call: Medication Refill Request    Has the patient contacted the pharmacy for the refill? Yes   Name of medication being requested: ondansetron (ZOFRAN ODT) 4 MG ODT tab  Provider who prescribed the medication: Gerson Cat CNM  Pharmacy: [facility] #[id number] [place] [address] (Ph: [phone])  Date medication is needed: Asap      Action Taken: Other: UMP [facility]    Travel Screening: Not Applicable     Date of Service:

## 2025-05-29 ENCOUNTER — MYC MEDICAL ADVICE (OUTPATIENT)
Dept: OBGYN | Facility: CLINIC | Age: 35
End: 2025-05-29
Payer: COMMERCIAL

## 2025-06-01 LAB
SCANNED LAB RESULT: NORMAL
SCANNED LAB RESULT: NORMAL

## 2025-06-02 ENCOUNTER — TELEPHONE (OUTPATIENT)
Dept: OBGYN | Facility: CLINIC | Age: 35
End: 2025-06-02
Payer: COMMERCIAL

## 2025-06-02 ENCOUNTER — TELEPHONE (OUTPATIENT)
Dept: MATERNAL FETAL MEDICINE | Facility: CLINIC | Age: 35
End: 2025-06-02
Payer: COMMERCIAL

## 2025-06-02 NOTE — TELEPHONE ENCOUNTER
[facility] Call [facility]    Phone Message    May a detailed message be left on voicemail: yes     Reason for Call: Medication Question or concern regarding medication   Prescription Clarification  Name of Medication: Zofran  Prescribing Provider: Gerson Cat CNM   Pharmacy:   [facility] #[id number] [address]      What on the order needs clarification? Pt has concerns about the amount due to severity of symptoms      Action Taken: Message routed to:  Other: [facility]    Travel Screening: Not Applicable     Date of Service:

## 2025-06-02 NOTE — TELEPHONE ENCOUNTER
Called Lidia and left a message that I was calling with carrier screening results. She was not a carrier for any of the 267 conditions. Her partner Yevgeniy is a carrier for GNE myopathy and nephrotic syndrome. He is not expected to develop symptoms of those conditions. Since Lidia was not a carrier, they are at low risk, <1%, to have a child with one of those conditions.    Darcie Valencia MS, [facility]  Maternal Fetal Medicine  [facility]  Phone:[phone]

## 2025-06-20 ENCOUNTER — LAB (OUTPATIENT)
Dept: LAB | Facility: CLINIC | Age: 35
End: 2025-06-20
Attending: ADVANCED PRACTICE MIDWIFE
Payer: COMMERCIAL

## 2025-06-20 DIAGNOSIS — O09.91 SUPERVISION OF HIGH RISK PREGNANCY IN FIRST TRIMESTER: ICD-10-CM

## 2025-06-20 PROCEDURE — 36415 COLL VENOUS BLD VENIPUNCTURE: CPT

## 2025-06-20 PROCEDURE — 82105 ALPHA-FETOPROTEIN SERUM: CPT

## 2025-06-22 LAB
# FETUSES US: NORMAL
AFP MOM SERPL: 1.26
AFP SERPL-MCNC: 64 NG/ML
AGE - REPORTED: 37.4 YR
CURRENT SMOKER: NO
FAMILY MEMBER DISEASES HX: NO
GA METHOD: NORMAL
GA: NORMAL WK
IDDM PATIENT QL: NO
INTEGRATED SCN PATIENT-IMP: NORMAL
SPECIMEN DRAWN SERPL: NORMAL

## 2025-06-23 ENCOUNTER — RESULTS FOLLOW-UP (OUTPATIENT)
Dept: OBGYN | Facility: CLINIC | Age: 35
End: 2025-06-23

## 2025-06-28 ENCOUNTER — MYC MEDICAL ADVICE (OUTPATIENT)
Dept: OBGYN | Facility: CLINIC | Age: 35
End: 2025-06-28
Payer: COMMERCIAL

## 2025-07-01 ENCOUNTER — INFUSION THERAPY VISIT (OUTPATIENT)
Dept: INFUSION THERAPY | Facility: CLINIC | Age: 35
End: 2025-07-01
Attending: ADVANCED PRACTICE MIDWIFE
Payer: COMMERCIAL

## 2025-07-01 VITALS
TEMPERATURE: 97.9 F | HEART RATE: 61 BPM | OXYGEN SATURATION: 100 % | SYSTOLIC BLOOD PRESSURE: 91 MMHG | DIASTOLIC BLOOD PRESSURE: 65 MMHG

## 2025-07-01 DIAGNOSIS — O21.0 HYPEREMESIS GRAVIDARUM: Primary | ICD-10-CM

## 2025-07-01 DIAGNOSIS — O09.91 SUPERVISION OF HIGH RISK PREGNANCY IN FIRST TRIMESTER: ICD-10-CM

## 2025-07-01 PROCEDURE — 250N000009 HC RX 250: Performed by: ADVANCED PRACTICE MIDWIFE

## 2025-07-01 PROCEDURE — 250N000011 HC RX IP 250 OP 636: Performed by: ADVANCED PRACTICE MIDWIFE

## 2025-07-01 PROCEDURE — 258N000003 HC RX IP 258 OP 636: Performed by: ADVANCED PRACTICE MIDWIFE

## 2025-07-01 PROCEDURE — 96365 THER/PROPH/DIAG IV INF INIT: CPT

## 2025-07-01 PROCEDURE — 96375 TX/PRO/DX INJ NEW DRUG ADDON: CPT

## 2025-07-01 RX ORDER — METHYLPREDNISOLONE SODIUM SUCCINATE 40 MG/ML
40 INJECTION INTRAMUSCULAR; INTRAVENOUS
Start: 2025-07-01

## 2025-07-01 RX ORDER — MEPERIDINE HYDROCHLORIDE 25 MG/ML
25 INJECTION INTRAMUSCULAR; INTRAVENOUS; SUBCUTANEOUS
OUTPATIENT
Start: 2025-07-01

## 2025-07-01 RX ORDER — DIPHENHYDRAMINE HYDROCHLORIDE 50 MG/ML
50 INJECTION, SOLUTION INTRAMUSCULAR; INTRAVENOUS
Start: 2025-07-01

## 2025-07-01 RX ORDER — EPINEPHRINE 1 MG/ML
0.3 INJECTION, SOLUTION, CONCENTRATE INTRAVENOUS EVERY 5 MIN PRN
OUTPATIENT
Start: 2025-07-01

## 2025-07-01 RX ORDER — ALBUTEROL SULFATE 90 UG/1
1-2 INHALANT RESPIRATORY (INHALATION)
Start: 2025-07-01

## 2025-07-01 RX ORDER — HEPARIN SODIUM (PORCINE) LOCK FLUSH IV SOLN 100 UNIT/ML 100 UNIT/ML
5 SOLUTION INTRAVENOUS
OUTPATIENT
Start: 2025-07-01

## 2025-07-01 RX ORDER — DIPHENHYDRAMINE HYDROCHLORIDE 50 MG/ML
25 INJECTION, SOLUTION INTRAMUSCULAR; INTRAVENOUS
Start: 2025-07-01

## 2025-07-01 RX ORDER — ONDANSETRON 2 MG/ML
4 INJECTION INTRAMUSCULAR; INTRAVENOUS ONCE
Status: COMPLETED | OUTPATIENT
Start: 2025-07-01 | End: 2025-07-01

## 2025-07-01 RX ORDER — ONDANSETRON 2 MG/ML
4 INJECTION INTRAMUSCULAR; INTRAVENOUS ONCE
Start: 2025-07-01 | End: 2025-07-01

## 2025-07-01 RX ORDER — HEPARIN SODIUM,PORCINE 10 UNIT/ML
5-20 VIAL (ML) INTRAVENOUS DAILY PRN
OUTPATIENT
Start: 2025-07-01

## 2025-07-01 RX ORDER — ALBUTEROL SULFATE 0.83 MG/ML
2.5 SOLUTION RESPIRATORY (INHALATION)
OUTPATIENT
Start: 2025-07-01

## 2025-07-01 RX ADMIN — ONDANSETRON 4 MG: 2 INJECTION INTRAMUSCULAR; INTRAVENOUS at 10:14

## 2025-07-01 RX ADMIN — ASCORBIC ACID, VITAMIN A PALMITATE, CHOLECALCIFEROL, THIAMINE HYDROCHLORIDE, RIBOFLAVIN-5 PHOSPHATE SODIUM, PYRIDOXINE HYDROCHLORIDE, NIACINAMIDE, DEXPANTHENOL, ALPHA-TOCOPHEROL ACETATE, VITAMIN K1, FOLIC ACID, BIOTIN, CYANOCOBALAMIN: 200; 3300; 200; 6; 3.6; 6; 40; 15; 10; 150; 600; 60; 5 INJECTION, SOLUTION INTRAVENOUS at 10:29

## 2025-07-01 NOTE — PROGRESS NOTES
Infusion Nursing Note:  Lidia García presents today for IV fluids & zofran.    Patient seen by provider today: No   present during visit today: Not Applicable.    Note: No new issues. Nausea has slightly increased since last infusion.    Intravenous Access:  Peripheral IV placed.    Treatment Conditions:  Not Applicable.      Post Infusion Assessment:  Patient tolerated infusion without incident.  Blood return noted pre and post infusion.  Site patent and intact, free from redness, edema or discomfort.  No evidence of extravasations.  Access discontinued per protocol.       Discharge Plan:   Discharge instructions reviewed with: Patient.  Patient and/or family verbalized understanding of discharge instructions and all questions answered.  Patient discharged in stable condition accompanied by: self.  Departure Mode: Ambulatory.      Sarahi House RN

## 2025-07-02 ENCOUNTER — RESULTS FOLLOW-UP (OUTPATIENT)
Dept: OBGYN | Facility: CLINIC | Age: 35
End: 2025-07-02

## 2025-07-02 ENCOUNTER — OFFICE VISIT (OUTPATIENT)
Dept: MATERNAL FETAL MEDICINE | Facility: CLINIC | Age: 35
End: 2025-07-02
Attending: ADVANCED PRACTICE MIDWIFE
Payer: COMMERCIAL

## 2025-07-02 ENCOUNTER — HOSPITAL ENCOUNTER (OUTPATIENT)
Dept: ULTRASOUND IMAGING | Facility: CLINIC | Age: 35
Discharge: HOME | End: 2025-07-02
Attending: ADVANCED PRACTICE MIDWIFE
Payer: COMMERCIAL

## 2025-07-02 DIAGNOSIS — O09.512 PRIMIGRAVIDA OF ADVANCED MATERNAL AGE IN SECOND TRIMESTER: Primary | ICD-10-CM

## 2025-07-02 DIAGNOSIS — O21.0 HYPEREMESIS GRAVIDARUM: ICD-10-CM

## 2025-07-02 DIAGNOSIS — O36.5920 MATERNAL CARE FOR OTHER KNOWN OR SUSPECTED POOR FETAL GROWTH, SECOND TRIMESTER, NOT APPLICABLE OR UNSPECIFIED: ICD-10-CM

## 2025-07-02 DIAGNOSIS — O26.90 PREGNANCY RELATED CONDITION, ANTEPARTUM: ICD-10-CM

## 2025-07-02 PROCEDURE — 76811 OB US DETAILED SNGL FETUS: CPT

## 2025-07-02 NOTE — PROGRESS NOTES
"Please see \"Imaging\" tab under \"Chart Review\" for details of today's visit.    Catrina Goodman    "

## 2025-07-02 NOTE — NURSING NOTE
Patient reports positive fetal movement, no pain, no contractions, leaking of fluid, or bleeding. Patient states is she feeling less nausea and she is still receiving IV fluids every 1.5 weeks. She still using the Zofran which helps. As she has been feeling better she has been moving around more and is experiencing some lower round ligament pain. Encouraged her to share this information with her OB provider next week at that appointment.  Education provided to patient on today's ultrasound.  SBAR given to JACKIE CONTRERAS, see their note in Epic.

## 2025-07-20 ENCOUNTER — HEALTH MAINTENANCE LETTER (OUTPATIENT)
Age: 35
End: 2025-07-20

## 2025-07-21 ENCOUNTER — MYC MEDICAL ADVICE (OUTPATIENT)
Dept: OBGYN | Facility: CLINIC | Age: 35
End: 2025-07-21
Payer: COMMERCIAL

## 2025-07-25 ENCOUNTER — HOSPITAL ENCOUNTER (OUTPATIENT)
Dept: ULTRASOUND IMAGING | Facility: CLINIC | Age: 35
Discharge: HOME | End: 2025-07-25
Attending: OBSTETRICS & GYNECOLOGY
Payer: COMMERCIAL

## 2025-07-25 DIAGNOSIS — O21.0 HYPEREMESIS GRAVIDARUM: ICD-10-CM

## 2025-07-25 PROCEDURE — 76816 OB US FOLLOW-UP PER FETUS: CPT | Mod: 26 | Performed by: OBSTETRICS & GYNECOLOGY

## 2025-07-25 PROCEDURE — 76816 OB US FOLLOW-UP PER FETUS: CPT

## 2025-08-08 ENCOUNTER — HOSPITAL ENCOUNTER (OUTPATIENT)
Dept: ULTRASOUND IMAGING | Facility: CLINIC | Age: 35
Discharge: HOME | End: 2025-08-08
Attending: OBSTETRICS & GYNECOLOGY
Payer: COMMERCIAL

## 2025-08-08 DIAGNOSIS — Z36.2 ENCOUNTER FOR FOLLOW-UP ULTRASOUND OF FETAL ANATOMY: ICD-10-CM

## 2025-08-08 PROCEDURE — 76816 OB US FOLLOW-UP PER FETUS: CPT

## 2025-08-11 ENCOUNTER — TELEPHONE (OUTPATIENT)
Dept: OBGYN | Facility: CLINIC | Age: 35
End: 2025-08-11
Payer: COMMERCIAL

## 2025-08-11 DIAGNOSIS — R11.0 NAUSEA: ICD-10-CM

## 2025-08-11 RX ORDER — ONDANSETRON 4 MG/1
4 TABLET, ORALLY DISINTEGRATING ORAL EVERY 8 HOURS PRN
Qty: 30 TABLET | Refills: 1 | Status: SHIPPED | OUTPATIENT
Start: 2025-08-11

## (undated) RX ORDER — ONDANSETRON 2 MG/ML
INJECTION INTRAMUSCULAR; INTRAVENOUS
Status: DISPENSED
Start: 2025-05-05

## (undated) RX ORDER — ONDANSETRON 2 MG/ML
INJECTION INTRAMUSCULAR; INTRAVENOUS
Status: DISPENSED
Start: 2025-07-01